# Patient Record
Sex: FEMALE | Race: WHITE | NOT HISPANIC OR LATINO
[De-identification: names, ages, dates, MRNs, and addresses within clinical notes are randomized per-mention and may not be internally consistent; named-entity substitution may affect disease eponyms.]

---

## 2017-05-19 ENCOUNTER — HOSPITAL (OUTPATIENT)
Dept: OTHER | Age: 22
End: 2017-05-19
Attending: NURSE PRACTITIONER

## 2017-05-19 LAB
CHLAMYDIA PROBE: NEGATIVE
CHLAMYDIA/GC SOURCE: NORMAL
CONTROL LINE: PRESENT
GC PROBE: NEGATIVE
HSV PCR SOURCE: NORMAL
HSV TYPE 1: NOT DETECTED
HSV TYPE 2: NOT DETECTED
Lab: CLEAR
N GON SOURCE: NORMAL
TRICH POC: NORMAL

## 2017-07-13 ENCOUNTER — HOSPITAL (OUTPATIENT)
Dept: OTHER | Age: 22
End: 2017-07-13
Attending: FAMILY MEDICINE

## 2018-02-25 ENCOUNTER — HOSPITAL (OUTPATIENT)
Dept: OTHER | Age: 23
End: 2018-02-25
Attending: FAMILY MEDICINE

## 2018-12-01 NOTE — LETTER
24    Paz Paz  :  1995    To Whom It May Concern:    This patient was seen in our office on 24.  Work status: She will continue to work no more than 6 hour shifts without lifting restrictions. I am recommending Xray and MRI of the lumbar spine, and then she is to follow up after the imaging is done.   If this office may be of further assistance, please do not hesitate to contact us.      Sincerely,        Galo Wilks DO    
[id number] [facility]

## 2019-01-23 ENCOUNTER — HOSPITAL (OUTPATIENT)
Dept: OTHER | Age: 24
End: 2019-01-23
Attending: PHYSICIAN ASSISTANT

## 2019-01-23 LAB
CONTROL LINE: PRESENT
CONTROL LINE: PRESENT
Lab: CLEAR
Lab: CLEAR
MONO POC: NEGATIVE
STREP POC: NEGATIVE

## 2020-01-20 ENCOUNTER — HOSPITAL (OUTPATIENT)
Dept: OTHER | Age: 25
End: 2020-01-20
Attending: NURSE PRACTITIONER

## 2020-01-20 LAB
CHLAMYDIA PROBE: NEGATIVE
CONTROL LINE: PRESENT
CONTROL LINE: PRESENT
GC PROBE: POSITIVE
Lab: CLEAR
Lab: CLEAR
N GON SOURCE: ABNORMAL
TRICH POC: NORMAL
UA APPEAR POC: CLEAR
UA BILI POC: NEGATIVE
UA BLOOD POC: ABNORMAL
UA COLOR POC: YELLOW
UA GLUCOSE POC: NEGATIVE
UA KETONES POC: NEGATIVE
UA LEUK EST POC: NEGATIVE
UA NITRITE POC: NEGATIVE
UA PH POC: 5.5 (ref 5–7)
UA PROTEIN POC: NEGATIVE
UA SPEC GRAV POC: 1.03 (ref 1.01–1.02)
UA UROBILIN POC: 0.2 MG/DL
URINE PREG POC: NEGATIVE

## 2020-02-08 ENCOUNTER — TELEPHONE (OUTPATIENT)
Dept: SCHEDULING | Age: 25
End: 2020-02-08

## 2021-08-21 ENCOUNTER — TELEPHONE (OUTPATIENT)
Dept: INTERNAL MEDICINE CLINIC | Facility: HOSPITAL | Age: 26
End: 2021-08-21

## 2021-08-21 DIAGNOSIS — Z20.822 CLOSE EXPOSURE TO COVID-19 VIRUS: Primary | ICD-10-CM

## 2021-08-21 NOTE — TELEPHONE ENCOUNTER
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 (e.g., during meal breaks):  Yes [x]   No []    If yes, who:   Do you share a workspace? Yes []   No [x]       If yes, with whom?     Do you have any family members si next 14 days. Test w/ [facility] 3-5 days after exposure.                                                  COVID-19 testing ordered: [] Rapid    [x] [facility]    Date test is to be taken:    8/24/21    []  Outside testing       [x] Manager notified

## 2021-08-24 ENCOUNTER — LAB ENCOUNTER (OUTPATIENT)
Dept: LAB | Facility: HOSPITAL | Age: 26
End: 2021-08-24
Attending: PREVENTIVE MEDICINE

## 2021-08-24 DIAGNOSIS — Z20.822 CLOSE EXPOSURE TO COVID-19 VIRUS: ICD-10-CM

## 2021-08-26 LAB — SARS-COV-2 RNA RESP QL NAA+PROBE: NOT DETECTED

## 2021-08-26 NOTE — TELEPHONE ENCOUNTER
Results and RTW guidelines:    COVID RESULT:    [] Viewed by employee in [address] RTW plan and instructions as indicated on triage call. Manager notified. Estimated RTW date:   [x] Discussed with employee   [] Unable to reach by phone.   Sent via [employer] new/worsening sx.   Discuss RTW guidelines with manager  [] May continue to work  [x] If test result is negative, return to work after 7 days from exposure date  [] Follow up with PCP  [] Home until further instruction from hotline with [facility] results  INS

## 2022-01-06 ENCOUNTER — TELEPHONE (OUTPATIENT)
Dept: INTERNAL MEDICINE CLINIC | Facility: HOSPITAL | Age: 27
End: 2022-01-06

## 2022-01-07 NOTE — TELEPHONE ENCOUNTER
Outside Covid Testing done    Results and RTW guidelines:    COVID RESULT reported:      Test type:    [x] Rapid outside         [] PCR outside    Date of test: 1/5/22     Test location: [address] [place] [address]         [] Result viewed in Epic with verbal consent received fro has a fever, vomiting or diarrhea   - Keep communication open with management about RTW and if symptoms worsen     Notes:     RTW PLAN:    []  If COVID positive results, off work minimum of 5 days from positive test or onset of symptoms (day 0)    [] Date of symptom onset: 1/4/22    SYMPTOMS:  [] asymptomatic    • Fever > 100F               Yes []          • Cough                          Yes []      • Shortness of breath  Yes []      • Congestion                 Yes []      • Runny nose

## 2022-08-05 ENCOUNTER — TELEPHONE (OUTPATIENT)
Dept: INTERNAL MEDICINE CLINIC | Facility: HOSPITAL | Age: 27
End: 2022-08-05

## 2022-08-05 ENCOUNTER — LAB ENCOUNTER (OUTPATIENT)
Dept: LAB | Facility: HOSPITAL | Age: 27
End: 2022-08-05
Attending: PREVENTIVE MEDICINE

## 2022-08-05 DIAGNOSIS — Z20.822 SUSPECTED 2019 NOVEL CORONAVIRUS INFECTION: Primary | ICD-10-CM

## 2022-08-05 DIAGNOSIS — Z20.822 SUSPECTED 2019 NOVEL CORONAVIRUS INFECTION: ICD-10-CM

## 2022-08-05 LAB — SARS-COV-2 RNA RESP QL NAA+PROBE: DETECTED

## 2022-08-05 NOTE — TELEPHONE ENCOUNTER
Results and RTW guidelines:    COVID RESULT:    [x] Viewed by employee in [address] RTW plan and instructions as indicated on triage call. Manager notified. Estimated RTW date:   [x] Discussed with employee   [] Unable to reach by phone. Sent via [employer] message      Test type:    [x] Rapid         [] [facility]         [] Outside test:       [x] Positive  Is employee unvaccinated? Yes []   No [x]          If yes:  Enter Covid Positive Medical exemption on Exemption Spreadsheet    Did you have close contact with someone on your unit while not wearing a mask? (e.g., during meal breaks):  Yes []   No [x]     If yes, who:     - Employee should quarantine at home for at least 5 days (day 1 is day after sx onset) , follow the CDC guidelines for cleaning and                              quarantining; see CDC.gov   -This employee may RTW on day 6 if asymptomatic or mildly symptomatic (with improving symptoms). Call Employee Health on day 5 if unable to return on day 6 after                      symptom onset.    -This employee needs to call Employee Health on day 5 after symptom onset. The employee needs to be cleared by Employee Health. - Monitor symptoms and temperature                 - Notify PCP of result                 - Seek emergent care with worsening symptoms   - If employee is still experiencing severe symptoms on day 5 must make a RTW appt with [employer] [facility], Employee will not be cleared if:    1. Has consistent cough, shortness of breath or fatigue that restricts your physical activities    2. Is still feeling \"unwell\"    3. Within 15 days of hospitalization for COVID    4. Within 20 days of intubation for COVID    5.  Still has a fever, vomiting or diarrhea   - Keep communication open with management about RTW and if symptoms worsen                - If outside testing completed, bring a copy of result to RTW appointment           Notes:     RTW PLAN:    [x]  If COVID positive results, off work minimum of 5 days from positive test or onset of symptoms (day 0)        On day 5, if asymptomatic or mildly symptomatic (with improving symptoms) may return to work day 6          On day 5, if symptomatic, call Employee Health for RTW screening        []  COVID positive result - call Employee Health on day 5 after symptom onset. The employee needs to be cleared by Employee Health to RTW. [] RTW immediately, continue to monitor for sx  [] RTW when sx improve; must be fever free for 24 hours w/o medications, Diarrhea/Vomiting free for 24 hours w/o medications  [] [facility] ordered; continue to monitor sx and call for new/worsening sx.   Discuss RTW guidelines with manager  [] May continue to work  [x] Follow up with PCP  [] Home until further instruction from hotline with [facility] results  INSTRUCTIONS PROVIDED:  [x]  Plan as noted above  []  Length of time to obtain results   [x]  Quarantine instructions  [x]  Masking protocol   []  S/S of worsening infection/condition and importance of prompt medical re-evaluation including when to seek emergency care  [] If symptoms develop, stay home and call hotline for rapid test order    Estimated RTW date:  8/9    [x] The employee voiced understanding of above plan/instructions  [x] Manager Notified

## 2022-11-17 ENCOUNTER — IMMUNIZATION (OUTPATIENT)
Dept: LAB | Facility: HOSPITAL | Age: 27
End: 2022-11-17
Attending: PREVENTIVE MEDICINE

## 2022-11-17 DIAGNOSIS — Z23 NEED FOR VACCINATION: Primary | ICD-10-CM

## 2022-11-17 PROCEDURE — 90471 IMMUNIZATION ADMIN: CPT

## 2023-04-20 ENCOUNTER — HOSPITAL ENCOUNTER (OUTPATIENT)
Age: 28
Discharge: HOME | End: 2023-04-20
Payer: COMMERCIAL

## 2023-04-20 VITALS
WEIGHT: 170 LBS | TEMPERATURE: 100 F | RESPIRATION RATE: 18 BRPM | HEIGHT: 66 IN | SYSTOLIC BLOOD PRESSURE: 136 MMHG | OXYGEN SATURATION: 97 % | BODY MASS INDEX: 27.32 KG/M2 | DIASTOLIC BLOOD PRESSURE: 77 MMHG | HEART RATE: 102 BPM

## 2023-04-20 DIAGNOSIS — J02.9 ACUTE VIRAL PHARYNGITIS: Primary | ICD-10-CM

## 2023-04-20 DIAGNOSIS — L73.9 FOLLICULITIS: ICD-10-CM

## 2023-04-20 DIAGNOSIS — R11.0 NAUSEA: ICD-10-CM

## 2023-04-20 LAB — S PYO AG THROAT QL: NEGATIVE

## 2023-04-20 PROCEDURE — 99213 OFFICE O/P EST LOW 20 MIN: CPT | Performed by: PHYSICIAN ASSISTANT

## 2023-04-20 PROCEDURE — 87880 STREP A ASSAY W/OPTIC: CPT | Performed by: PHYSICIAN ASSISTANT

## 2023-04-20 RX ORDER — CEPHALEXIN 500 MG/1
500 CAPSULE ORAL 3 TIMES DAILY
Qty: 21 CAPSULE | Refills: 0 | Status: SHIPPED | OUTPATIENT
Start: 2023-04-20 | End: 2023-04-27

## 2023-04-20 RX ORDER — IBUPROFEN 600 MG/1
TABLET ORAL
Qty: 20 TABLET | Refills: 0 | Status: SHIPPED | OUTPATIENT
Start: 2023-04-20

## 2023-04-20 RX ORDER — ONDANSETRON 4 MG/1
4 TABLET, ORALLY DISINTEGRATING ORAL EVERY 8 HOURS PRN
Qty: 10 TABLET | Refills: 0 | Status: SHIPPED | OUTPATIENT
Start: 2023-04-20 | End: 2023-04-23

## 2023-04-20 RX ORDER — NORETHINDRONE ACETATE AND ETHINYL ESTRADIOL 1MG-20(21)
KIT ORAL
COMMUNITY
Start: 2023-01-25

## 2023-04-20 RX ORDER — SPIRONOLACTONE 50 MG/1
50 TABLET, FILM COATED ORAL DAILY
COMMUNITY

## 2023-04-20 NOTE — ED INITIAL ASSESSMENT (HPI)
Sore throat and nausea began yesterday. Was around friends with strep throat and hand/foot/mouth this past weekend. Tylenol 1g taken about 0330.

## 2023-04-30 ENCOUNTER — TELEPHONE (OUTPATIENT)
Dept: OBGYN CLINIC | Facility: CLINIC | Age: 28
End: 2023-04-30

## 2023-04-30 NOTE — TELEPHONE ENCOUNTER
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 She has a Gyne up near [facility] who recommended pelvic floor PT evaluation and treatment. Unfortunately, she needs internal referral for PT here due to insurance. What do we have to do? I assume she has to see one of us for exam and referral. She is co-worker. Can we use [facility] if she needs to see one of us please? Thanks.

## 2023-05-01 NOTE — TELEPHONE ENCOUNTER
We cannot place any orders for pts that have not been sen by us. She will have to schedule an appointment to be seen by one of our providers. She can also have her gyne place an order for her PT to be done here or she can call and schedule appt since PPO. Spoke to pt. [facility] was told the only way insurance will cover is by an order being placed. [facility] will call her Gyne and see if they are able to place an outside order for her to get her PT done through [address] [place] [address] if they cannot she will call office and schedule appt.

## 2024-02-05 RX ORDER — SCOLOPAMINE TRANSDERMAL SYSTEM 1 MG/1
1 PATCH, EXTENDED RELEASE TRANSDERMAL
Qty: 10 PATCH | Refills: 1 | Status: SHIPPED | OUTPATIENT
Start: 2024-02-05

## 2024-02-05 RX ORDER — ONDANSETRON 4 MG/1
4 TABLET, ORALLY DISINTEGRATING ORAL EVERY 8 HOURS PRN
Qty: 30 TABLET | Refills: 0 | Status: SHIPPED | OUTPATIENT
Start: 2024-02-05

## 2024-05-06 ENCOUNTER — HOSPITAL ENCOUNTER (OUTPATIENT)
Age: 29
Discharge: HOME | End: 2024-05-06
Payer: COMMERCIAL

## 2024-05-06 VITALS
SYSTOLIC BLOOD PRESSURE: 116 MMHG | OXYGEN SATURATION: 98 % | DIASTOLIC BLOOD PRESSURE: 70 MMHG | HEART RATE: 101 BPM | TEMPERATURE: 100 F | RESPIRATION RATE: 18 BRPM

## 2024-05-06 DIAGNOSIS — J02.9 PHARYNGITIS, UNSPECIFIED ETIOLOGY: Primary | ICD-10-CM

## 2024-05-06 LAB
B-HCG UR QL: NEGATIVE
CLARITY UR: CLEAR
COLOR UR: YELLOW
GLUCOSE UR STRIP-MCNC: NEGATIVE MG/DL
KETONES UR STRIP-MCNC: 80 MG/DL
LEUKOCYTE ESTERASE UR QL STRIP: NEGATIVE
NITRITE UR QL STRIP: NEGATIVE
PH UR STRIP: 6 [PH]
POCT INFLUENZA A: NEGATIVE
POCT INFLUENZA B: NEGATIVE
POCT MONO: NEGATIVE
PROT UR STRIP-MCNC: 30 MG/DL
S PYO AG THROAT QL: NEGATIVE
SARS-COV-2 RNA RESP QL NAA+PROBE: NOT DETECTED
SP GR UR STRIP: >=1.03
UROBILINOGEN UR STRIP-ACNC: <2 MG/DL

## 2024-05-06 PROCEDURE — 81002 URINALYSIS NONAUTO W/O SCOPE: CPT | Performed by: NURSE PRACTITIONER

## 2024-05-06 PROCEDURE — U0002 COVID-19 LAB TEST NON-CDC: HCPCS | Performed by: NURSE PRACTITIONER

## 2024-05-06 PROCEDURE — 87880 STREP A ASSAY W/OPTIC: CPT | Performed by: NURSE PRACTITIONER

## 2024-05-06 PROCEDURE — 96372 THER/PROPH/DIAG INJ SC/IM: CPT | Performed by: NURSE PRACTITIONER

## 2024-05-06 PROCEDURE — 99214 OFFICE O/P EST MOD 30 MIN: CPT | Performed by: NURSE PRACTITIONER

## 2024-05-06 PROCEDURE — 86308 HETEROPHILE ANTIBODY SCREEN: CPT | Performed by: NURSE PRACTITIONER

## 2024-05-06 PROCEDURE — 87502 INFLUENZA DNA AMP PROBE: CPT | Performed by: NURSE PRACTITIONER

## 2024-05-06 PROCEDURE — 81025 URINE PREGNANCY TEST: CPT | Performed by: NURSE PRACTITIONER

## 2024-05-06 RX ORDER — AMOXICILLIN 500 MG/1
500 TABLET, FILM COATED ORAL 2 TIMES DAILY
Qty: 20 TABLET | Refills: 0 | Status: SHIPPED | OUTPATIENT
Start: 2024-05-06 | End: 2024-05-16

## 2024-05-06 RX ORDER — AZITHROMYCIN 250 MG/1
1000 TABLET, FILM COATED ORAL ONCE
Status: COMPLETED | OUTPATIENT
Start: 2024-05-06 | End: 2024-05-06

## 2024-05-06 RX ORDER — KETOROLAC TROMETHAMINE 30 MG/ML
30 INJECTION, SOLUTION INTRAMUSCULAR; INTRAVENOUS ONCE
Status: COMPLETED | OUTPATIENT
Start: 2024-05-06 | End: 2024-05-06

## 2024-05-06 RX ORDER — DEXAMETHASONE SODIUM PHOSPHATE 10 MG/ML
10 INJECTION, SOLUTION INTRAMUSCULAR; INTRAVENOUS ONCE
Status: COMPLETED | OUTPATIENT
Start: 2024-05-06 | End: 2024-05-06

## 2024-05-06 NOTE — ED PROVIDER NOTES
Patient Seen in: [facility]      History     Chief Complaint   Patient presents with    Fever     Stated Complaint: Cold  Subjective:   29-year-old female presents for a sore throat, body aches, fatigue, and fevers that started about 2 days ago.  No difficulty swallowing, but she states she has not eating or drinking because it hurts so bad to swallow.  Speech is clear.  No cough or congestion.  No neck stiffness.  No rashes.  No headaches or dizziness.  No known exposure to strep throat, but she did start having intercourse with an old partner and is unaware if he may have exposed her to an STD in her throat.  No rashes.  No vaginal complaints.  She appears nontoxic.      Objective:   History reviewed. No pertinent past medical history.         History reviewed. No pertinent surgical history.           No pertinent social history.          Review of Systems    Positive for stated complaint: Cold  Other systems are as noted in HPI.  Constitutional and vital signs reviewed.      All other systems reviewed and negative except as noted above.    Physical Exam     ED Triage Vitals [05/06/24 0907]   /70   Pulse 120   Resp 18   Temp 100.4 °F (38 °C)   Temp src Oral   SpO2 97 %   O2 Device None (Room air)     Current:/70   Pulse 101   Temp 100.4 °F (38 °C) (Oral)   Resp 18   SpO2 98%     Physical Exam  Vitals and nursing note reviewed.   Constitutional:       General: She is not in acute distress.     Appearance: Normal appearance. She is not toxic-appearing.   HENT:      Head: Normocephalic.      Right Ear: Tympanic membrane normal.      Left Ear: Tympanic membrane normal.      Nose: Nose normal. No congestion or rhinorrhea.      Mouth/Throat:      Mouth: Mucous membranes are moist.      Pharynx: Uvula midline. Oropharyngeal exudate and posterior oropharyngeal erythema present. No pharyngeal swelling or uvula swelling.      Tonsils: Tonsillar exudate present. No tonsillar abscesses.   Eyes:       Conjunctiva/sclera: Conjunctivae normal.      Pupils: Pupils are equal, round, and reactive to light.   Cardiovascular:      Rate and Rhythm: Regular rhythm. Tachycardia present.   Pulmonary:      Effort: Pulmonary effort is normal.      Breath sounds: Normal breath sounds. No stridor. No wheezing, rhonchi or rales.   Abdominal:      Palpations: Abdomen is soft.      Tenderness: There is no abdominal tenderness.   Musculoskeletal:         General: Normal range of motion.      Cervical back: Normal range of motion and neck supple.   Lymphadenopathy:      Cervical: Cervical adenopathy present.   Skin:     General: Skin is warm and dry.      Capillary Refill: Capillary refill takes less than 2 seconds.      Findings: No rash.   Neurological:      General: No focal deficit present.      Mental Status: She is alert and oriented to person, place, and time.   Psychiatric:         Mood and Affect: Mood normal.         Behavior: Behavior normal.         ED Course   No results found.  Labs Reviewed   [facility] POCT URINALYSIS DIPSTICK - Abnormal; Notable for the following components:       Result Value    Protein urine 30 (*)     Ketone, Urine 80 (*)     Bilirubin, Urine Small (*)     Blood, Urine Moderate (*)     All other components within normal limits   POCT MONO TEST - Normal   POCT RAPID STREP - Normal   POCT PREGNANCY URINE - Normal   POCT FLU TEST - Normal    Narrative:     This assay is a rapid molecular in vitro test utilizing nucleic acid amplification of influenza A and B viral RNA.   RAPID SARS-COV-2 BY PCR - Normal   CHLAMYDIA/GONOCOCCUS, PHARY       MDM     Medical Decision Making  The patient is aware of all of the testing results below.  I gave her IV fluids with Toradol and Decadron.  She is feeling better.  I will treat her for gonorrhea and chlamydia due to her suspicion that she was exposed with ceftriaxone and Zithromax.  I will also treat her for bacterial throat infection with amoxicillin.  We discussed  pushing fluids, rest, and Tylenol or Motrin as needed for pain or fever.  She will follow-up with her primary care doctor.  She is aware for gonorrhea and chlamydia test come back positive, her partner will need treatment.    Amount and/or Complexity of Data Reviewed  Labs: ordered.     Details: Strep, COVID, flu, and mono tests are all negative.  The urine dip shows ketones and 30 of protein with moderate blood.  She is due for her menses.  UCG negative.  A gonorrhea and chlamydia of the throat is pending.  Discussion of management or test interpretation with external provider(s): I discussed this patient with Dr. Costa.  She agrees with plan of care.    Risk  OTC drugs.  Prescription drug management.  Risk Details: Differential diagnoses are gonorrhea of the throat, chlamydia of the throat, strep throat, viral syndrome        Disposition and Plan     Clinical Impression:  1. Pharyngitis, unspecified etiology         Disposition:  Discharge  5/6/2024 11:41 am    Follow-up:  PMD    Schedule an appointment as soon as possible for a visit in 3 days            Medications Prescribed:  Current Discharge Medication List        START taking these medications    Details   amoxicillin 500 MG Oral Tab Take 1 tablet (500 mg total) by mouth 2 (two) times daily for 10 days.  Qty: 20 tablet, Refills: 0

## 2024-05-06 NOTE — DISCHARGE INSTRUCTIONS
Push fluids.  Rest.  Salt water gargles.  Tylenol or Motrin as needed for pain or fever.  Take the amoxicillin as prescribed.  Your gonorrhea and Chlamydia testing is pending.  If this test comes back positive, your partner will need to be treated.  Follow-up with your doctor.  For any worsening or concerning symptoms, please go to the nearest emergency department for further evaluation.

## 2024-05-06 NOTE — ED INITIAL ASSESSMENT (HPI)
Pt with fever (tmax 102.7). headache, sore throat and body aches since yesterday. Pt also concerned for STD after getting back together with old boyfriend.

## 2024-05-08 LAB
C. TRACHOMATIS, NAA, PHARYN: NEGATIVE
N. GONORRHOEAE, NAA, PHARYN: NEGATIVE

## 2024-05-14 ENCOUNTER — LAB ENCOUNTER (OUTPATIENT)
Dept: LAB | Facility: HOSPITAL | Age: 29
End: 2024-05-14
Attending: ADVANCED PRACTICE MIDWIFE

## 2024-05-14 ENCOUNTER — OFFICE VISIT (OUTPATIENT)
Dept: OBGYN CLINIC | Facility: CLINIC | Age: 29
End: 2024-05-14

## 2024-05-14 VITALS
DIASTOLIC BLOOD PRESSURE: 85 MMHG | HEIGHT: 66 IN | SYSTOLIC BLOOD PRESSURE: 130 MMHG | WEIGHT: 162 LBS | HEART RATE: 101 BPM | BODY MASS INDEX: 26.03 KG/M2

## 2024-05-14 DIAGNOSIS — Z11.3 ROUTINE SCREENING FOR STI (SEXUALLY TRANSMITTED INFECTION): ICD-10-CM

## 2024-05-14 DIAGNOSIS — Z01.419 WELL WOMAN EXAM WITH ROUTINE GYNECOLOGICAL EXAM: Primary | ICD-10-CM

## 2024-05-14 PROBLEM — K59.00 CONSTIPATION: Status: ACTIVE | Noted: 2024-05-14

## 2024-05-14 PROBLEM — B00.9 HERPES SIMPLEX TYPE 1 ANTIBODY POSITIVE: Status: ACTIVE | Noted: 2024-05-14

## 2024-05-14 LAB
HBV SURFACE AG SER-ACNC: <0.1 [IU]/L
HBV SURFACE AG SERPL QL IA: NONREACTIVE
HCV AB SERPL QL IA: NONREACTIVE
T PALLIDUM AB SER QL IA: NONREACTIVE

## 2024-05-14 PROCEDURE — 86803 HEPATITIS C AB TEST: CPT

## 2024-05-14 PROCEDURE — 87340 HEPATITIS B SURFACE AG IA: CPT

## 2024-05-14 PROCEDURE — 87389 HIV-1 AG W/HIV-1&-2 AB AG IA: CPT

## 2024-05-14 PROCEDURE — 36415 COLL VENOUS BLD VENIPUNCTURE: CPT

## 2024-05-14 PROCEDURE — 99385 PREV VISIT NEW AGE 18-39: CPT | Performed by: ADVANCED PRACTICE MIDWIFE

## 2024-05-14 PROCEDURE — 86780 TREPONEMA PALLIDUM: CPT

## 2024-05-14 RX ORDER — NORETHINDRONE ACETATE AND ETHINYL ESTRADIOL 1MG-20(21)
1 KIT ORAL DAILY
Qty: 84 TABLET | Refills: 3 | Status: SHIPPED | OUTPATIENT
Start: 2024-05-14

## 2024-05-14 NOTE — PROGRESS NOTES
Chief Complaint   Patient presents with    Gyn Exam     Annual exam. Per patient requesting STD labs done          HPI:   Paz is 29 year old , here today Annual and STD testing. Had broken up with partner of 10 mos and he was with someone else. They got back together and she is concerned about STD exposure.  She had stopped taking her OCPs when they broke up but now wants to restart.  Needs a new RX.  She did have unprotected intercourse within the past 5 days.  Declines PlanB.  LMP was 24    She was seen in IC for sore throat and was treated with Rocephin inj and Azithromicin (STD prophylaxis) and then Amoxicillin for strep thoat even though negative culture.   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    Menstrual cycles: monthly/ 4-5 days/ light   Sexually active: yes  Contraception:   STD screening: yes  Depression: denies    Diet/Exercise: Low carb. yes/   Routine dental care: yes  Seatbelt use: yes    Note: This is a gyn only visit. Pt does not have PCP at [facility], needs to find new PCP/is referred back to PCP for care of non-gyn any concerns listed here.    HISTORY:  History reviewed. No pertinent past medical history.   Hx Prior Abnormal Pap: Yes    History reviewed. No pertinent surgical history.   History reviewed. No pertinent family history.   Social History:   Social History     Socioeconomic History    Marital status: Single   Tobacco Use    Smoking status: Never    Smokeless tobacco: Never   Substance and Sexual Activity    Alcohol use: Yes     Comment: social    Drug use: Not Currently     Social Determinants of Health     Financial Resource Strain: Not on File (10/6/2022)    Received from [facility]     Financial Resource Strain     Financial Resource Strain: 0   Food Insecurity: Low Risk  (10/9/2023)    Received from [facility]    Food Insecurity     Have there been times that your food ran out, and you didn't have money to get  more?: No     Are there times that you worry that this might happen?: No   Transportation Needs: Low Risk  (10/9/2023)    [facility]    Transportation Needs     Do you have trouble getting transportation to medical appointments?: No     How do you normally get to and from your appointments?: Other   Physical Activity: Not on File (10/6/2022)    Received from [facility]     Physical Activity     Physical Activity: 0   Stress: Not on File (10/6/2022)    Received from [facility]     Stress     Stress: 0   Social Connections: Not on File (10/6/2022)    Received from [facility]     Social Connections     Social Connections and Isolation: 0       Safe relationship/safe home environment yes     Depression Screening (PHQ-2/PHQ-9): Over the LAST 2 WEEKS   Little interest or pleasure in doing things (over the last two weeks)?: Not at all  Little interest or pleasure in doing things: Not at all    Feeling down, depressed, or hopeless (over the last two weeks)?: Not at all  Feeling down, depressed, or hopeless: Not at all    PHQ-2 SCORE: 0  PHQ-2 SCORE: 0        Immunization History   Administered Date(s) Administered    Covid-19 Vaccine Pfizer 30 mcg/0.3 ml 10/13/2021, 11/05/2021    FLULAVAL 6 months & older 0.5 ml Prefilled syringe (43258) 11/17/2022    Influenza 10/20/2021        Medications (Active prior to today's visit):  Current Outpatient Medications   Medication Sig Dispense Refill    BLISOVI FE 1/20 1-20 MG-MCG Oral Tab Take 1 tablet by mouth daily. 84 tablet 3    amoxicillin 500 MG Oral Tab Take 1 tablet (500 mg total) by mouth 2 (two) times daily for 10 days. 20 tablet 0    spironolactone 50 MG Oral Tab Take 1 tablet (50 mg total) by mouth daily.      ondansetron 4 MG Oral Tablet Dispersible Take 1 tablet (4 mg total) by mouth every 8 (eight) hours as needed for Nausea. (Patient not taking: Reported on 5/14/2024) 30 tablet 0    Scopolamine 1.5mg TD patch 1mg/3days Place 1  patch onto the skin every third day. 10 patch 1    ibuprofen 600 MG Oral Tab Take 1 tablet (600 mg total) by mouth every 6 hours with food (Patient not taking: Reported on 5/14/2024) 20 tablet 0       Allergies:  Allergies   Allergen Reactions    Latex HIVES, ITCHING, RASH and SWELLING       ROS:  Review of Systems   Constitutional: Negative.    HENT: Negative.     Eyes: Negative.    Respiratory: Negative.     Cardiovascular: Negative.    Gastrointestinal: Negative.    Genitourinary: Negative.         Increased vaginal d/c   Musculoskeletal: Negative.    Skin: Negative.    Neurological: Negative.    Psychiatric/Behavioral: Negative.         PHYSICAL EXAM:  Vitals:    05/14/24 1208   BP: 130/85   Pulse: 101     Physical Exam  Vitals reviewed.   Constitutional:       Appearance: Normal appearance.   HENT:      Head: Normocephalic.   Cardiovascular:      Rate and Rhythm: Normal rate and regular rhythm.   Pulmonary:      Effort: Pulmonary effort is normal.      Breath sounds: Normal breath sounds.   Chest:   Breasts:     Right: Normal. No mass or tenderness.      Left: Normal. No mass or tenderness.   Genitourinary:     Pubic Area: No rash.       Labia:         Right: No lesion.         Left: No lesion.       Vagina: Normal. No vaginal discharge or bleeding.      Cervix: No cervical motion tenderness.      Uterus: Normal. Not enlarged and not tender.       Adnexa:         Right: No mass or tenderness.          Left: No mass or tenderness.     Lymphadenopathy:      Upper Body:      Right upper body: No axillary adenopathy.      Left upper body: No axillary adenopathy.      Lower Body: No right inguinal adenopathy. No left inguinal adenopathy.   Skin:     General: Skin is warm and dry.   Neurological:      Mental Status: She is alert and oriented to person, place, and time.   Psychiatric:         Mood and Affect: Mood normal.         Behavior: Behavior normal.          Last pap was 9/8/2021 NILM    No results found for  this or any previous visit (from the past 24 hour(s)).      ASSESSMENT/PLAN:     Paz was seen today for gyn exam.    Diagnoses and all orders for this visit:    Well woman exam with routine gynecological exam  -     Vaginitis Vaginosis PCR Panel; Future  -     Vaginitis Vaginosis PCR Panel    Routine screening for STI (sexually transmitted infection)  -     HCV Antibody; Future  -     Hepatitis B Surface Antigen; Future  -     HIV Ag/Ab Combo; Future  -     T Pallidum Screening Cascade; Future  -     Vaginitis Vaginosis PCR Panel; Future  -     Chlamydia/Gc Amplification; Future  -     Chlamydia/Gc Amplification  -     Vaginitis Vaginosis PCR Panel    Other orders  -     BLISOVI FE 1/20 1-20 MG-MCG Oral Tab; Take 1 tablet by mouth daily.        Next annual due: 1 year  Follow-up/Return to clinic: PRN    Counseling:   Best hygiene practices  Recommendations and rationale for COVID, Tdap, HPV, and flu shots  Contraceptive method(s), STI and HIV risk reduction/condom use  Emergency contraception reviewed  Intimate Partner Violence/recognition of sexual coercion and methods to resist and abstain as a choice  Frequency of health screening and personal risks  Pap, SBE/CBE, mammography  Smoking cessation/avoidance encouraged  Preconception counseling, including use of folic acid  Benefits of daily vitamin D, folic acid, and adequate fresh fruits and vegetables  Benefits of daily exercise      20 minutes face to face counseling, chart review, orders and coordination of care    Patient verbalized understanding, All questions answered. No barriers to learning identified    Priscilla Dooley CNM  Specimen

## 2024-05-15 LAB
BV BACTERIA DNA VAG QL NAA+PROBE: NEGATIVE
C GLABRATA DNA VAG QL NAA+PROBE: NEGATIVE
C KRUSEI DNA VAG QL NAA+PROBE: NEGATIVE
C TRACH DNA SPEC QL NAA+PROBE: NEGATIVE
CANDIDA DNA VAG QL NAA+PROBE: NEGATIVE
N GONORRHOEA DNA SPEC QL NAA+PROBE: NEGATIVE
T VAGINALIS DNA VAG QL NAA+PROBE: NEGATIVE

## 2024-05-21 ENCOUNTER — TELEPHONE (OUTPATIENT)
Dept: OBGYN CLINIC | Facility: CLINIC | Age: 29
End: 2024-05-21

## 2024-05-21 RX ORDER — METRONIDAZOLE 7.5 MG/G
1 GEL VAGINAL NIGHTLY
Qty: 70 G | Refills: 0 | Status: SHIPPED | OUTPATIENT
Start: 2024-05-21 | End: 2024-05-26

## 2024-05-21 NOTE — TELEPHONE ENCOUNTER
Called and spoke with Paz.  She is having symptoms of irritation/itching.  Denies clumpy discharge.  Would like to try Metrogel.   Rx sent to the [facility] pharmacy for Metrogel 0.75% intravag q HS x 5.  All questions were answered.  Pt verbalized understanding.    ----- Message from Mariaelena IRVING sent at 5/17/2024 11:06 AM CDT -----  Regarding: FW: Test results  Contact: [phone]    ----- Message -----  From: Paz Paz  Sent: 5/17/2024  10:10 AM CDT  To: [facility] Clinical Pool  Subject: Test results                                     I seen all the labs came back negative. I’m having greenish discharge though. I took a picture but it wouldn’t let me attach it here. Could it have been a false negative BV? I’m definitely still a little itchy. Thank you!

## 2024-09-04 ENCOUNTER — TELEPHONE (OUTPATIENT)
Dept: FAMILY MEDICINE CLINIC | Facility: CLINIC | Age: 29
End: 2024-09-04

## 2024-09-04 NOTE — TELEPHONE ENCOUNTER
Reason for Call/Symptoms: Seeking appointment to establish.  Former patient of Dr Coronado/[facility].  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  Wants to be tested for cortisol level and hypothyroidism.  Has had hair loss, weight gain of 14 pounds.  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  Onset: ongoing   Courtesy Assessment: Patient had normal TSH 10/10/2023.  Level of care recommendation:  Needs appointment to establish.  She agreed to see JOSIE to start workup, then plans to have Dr Ash as PCP.  Advice given to patient: Schedule appointment.    Future Appointments   Date Time Provider Department Center   9/16/2024  2:00 PM Kelsi Ledesma APRN [facility]   11/1/2024  1:30 PM Nancy Ash MD [facility]

## 2024-09-16 ENCOUNTER — LAB ENCOUNTER (OUTPATIENT)
Dept: LAB | Age: 29
End: 2024-09-16
Payer: COMMERCIAL

## 2024-09-16 ENCOUNTER — OFFICE VISIT (OUTPATIENT)
Dept: FAMILY MEDICINE CLINIC | Facility: CLINIC | Age: 29
End: 2024-09-16
Payer: COMMERCIAL

## 2024-09-16 VITALS
OXYGEN SATURATION: 96 % | HEART RATE: 80 BPM | SYSTOLIC BLOOD PRESSURE: 120 MMHG | WEIGHT: 170 LBS | BODY MASS INDEX: 27.32 KG/M2 | DIASTOLIC BLOOD PRESSURE: 85 MMHG | HEIGHT: 66 IN

## 2024-09-16 DIAGNOSIS — Z00.00 ROUTINE PHYSICAL EXAMINATION: Primary | ICD-10-CM

## 2024-09-16 DIAGNOSIS — L65.9 HAIR LOSS: ICD-10-CM

## 2024-09-16 DIAGNOSIS — G47.00 INSOMNIA, UNSPECIFIED TYPE: ICD-10-CM

## 2024-09-16 DIAGNOSIS — R63.5 WEIGHT GAIN: ICD-10-CM

## 2024-09-16 DIAGNOSIS — L91.8 SKIN TAG: ICD-10-CM

## 2024-09-16 DIAGNOSIS — R35.0 URINARY FREQUENCY: ICD-10-CM

## 2024-09-16 DIAGNOSIS — Z00.00 ROUTINE PHYSICAL EXAMINATION: ICD-10-CM

## 2024-09-16 DIAGNOSIS — L98.9 SKIN LESION: ICD-10-CM

## 2024-09-16 DIAGNOSIS — H69.91 EUSTACHIAN TUBE DYSFUNCTION, RIGHT: ICD-10-CM

## 2024-09-16 LAB
ALBUMIN SERPL-MCNC: 4.7 G/DL (ref 3.2–4.8)
ALBUMIN/GLOB SERPL: 1.4 {RATIO} (ref 1–2)
ALP LIVER SERPL-CCNC: 75 U/L
ALT SERPL-CCNC: 10 U/L
ANION GAP SERPL CALC-SCNC: 7 MMOL/L (ref 0–18)
AST SERPL-CCNC: 17 U/L (ref ?–34)
BASOPHILS # BLD AUTO: 0.01 X10(3) UL (ref 0–0.2)
BASOPHILS NFR BLD AUTO: 0.1 %
BILIRUB SERPL-MCNC: 0.6 MG/DL (ref 0.3–1.2)
BILIRUB UR QL: NEGATIVE
BUN BLD-MCNC: 14 MG/DL (ref 9–23)
BUN/CREAT SERPL: 15.7 (ref 10–20)
CALCIUM BLD-MCNC: 9.8 MG/DL (ref 8.7–10.4)
CHLORIDE SERPL-SCNC: 105 MMOL/L (ref 98–112)
CHOLEST SERPL-MCNC: 219 MG/DL (ref ?–200)
CLARITY UR: CLEAR
CO2 SERPL-SCNC: 26 MMOL/L (ref 21–32)
COLOR UR: YELLOW
CORTIS SERPL-MCNC: 17.7 UG/DL
CREAT BLD-MCNC: 0.89 MG/DL
DEPRECATED HBV CORE AB SER IA-ACNC: 222.6 NG/ML
DEPRECATED RDW RBC AUTO: 38.5 FL (ref 35.1–46.3)
EGFRCR SERPLBLD CKD-EPI 2021: 90 ML/MIN/1.73M2 (ref 60–?)
EOSINOPHIL # BLD AUTO: 0.03 X10(3) UL (ref 0–0.7)
EOSINOPHIL NFR BLD AUTO: 0.4 %
ERYTHROCYTE [DISTWIDTH] IN BLOOD BY AUTOMATED COUNT: 11.8 % (ref 11–15)
EST. AVERAGE GLUCOSE BLD GHB EST-MCNC: 105 MG/DL (ref 68–126)
FASTING PATIENT LIPID ANSWER: NO
FASTING STATUS PATIENT QL REPORTED: NO
GLOBULIN PLAS-MCNC: 3.4 G/DL (ref 2–3.5)
GLUCOSE BLD-MCNC: 79 MG/DL (ref 70–99)
GLUCOSE UR-MCNC: NORMAL MG/DL
HBA1C MFR BLD: 5.3 % (ref ?–5.7)
HCT VFR BLD AUTO: 44 %
HDLC SERPL-MCNC: 60 MG/DL (ref 40–59)
HGB BLD-MCNC: 14.7 G/DL
HGB UR QL STRIP.AUTO: NEGATIVE
IMM GRANULOCYTES # BLD AUTO: 0.02 X10(3) UL (ref 0–1)
IMM GRANULOCYTES NFR BLD: 0.3 %
IRON SATN MFR SERPL: 20 %
IRON SERPL-MCNC: 91 UG/DL
LDLC SERPL CALC-MCNC: 135 MG/DL (ref ?–100)
LEUKOCYTE ESTERASE UR QL STRIP.AUTO: NEGATIVE
LYMPHOCYTES # BLD AUTO: 1.71 X10(3) UL (ref 1–4)
LYMPHOCYTES NFR BLD AUTO: 22.6 %
MCH RBC QN AUTO: 29.9 PG (ref 26–34)
MCHC RBC AUTO-ENTMCNC: 33.4 G/DL (ref 31–37)
MCV RBC AUTO: 89.6 FL
MONOCYTES # BLD AUTO: 0.62 X10(3) UL (ref 0.1–1)
MONOCYTES NFR BLD AUTO: 8.2 %
NEUTROPHILS # BLD AUTO: 5.16 X10 (3) UL (ref 1.5–7.7)
NEUTROPHILS # BLD AUTO: 5.16 X10(3) UL (ref 1.5–7.7)
NEUTROPHILS NFR BLD AUTO: 68.4 %
NITRITE UR QL STRIP.AUTO: NEGATIVE
NONHDLC SERPL-MCNC: 159 MG/DL (ref ?–130)
OSMOLALITY SERPL CALC.SUM OF ELEC: 285 MOSM/KG (ref 275–295)
PH UR: 6 [PH] (ref 5–8)
PLATELET # BLD AUTO: 384 10(3)UL (ref 150–450)
POTASSIUM SERPL-SCNC: 4.3 MMOL/L (ref 3.5–5.1)
PROT SERPL-MCNC: 8.1 G/DL (ref 5.7–8.2)
PROT UR-MCNC: 20 MG/DL
RBC # BLD AUTO: 4.91 X10(6)UL
SODIUM SERPL-SCNC: 138 MMOL/L (ref 136–145)
SP GR UR STRIP: >1.03 (ref 1–1.03)
TIBC SERPL-MCNC: 447 UG/DL (ref 250–425)
TRANSFERRIN SERPL-MCNC: 300 MG/DL (ref 250–380)
TRIGL SERPL-MCNC: 134 MG/DL (ref 30–149)
TSI SER-ACNC: 0.78 MIU/ML (ref 0.55–4.78)
UROBILINOGEN UR STRIP-ACNC: NORMAL
VIT D+METAB SERPL-MCNC: 33.2 NG/ML (ref 30–100)
VLDLC SERPL CALC-MCNC: 24 MG/DL (ref 0–30)
WBC # BLD AUTO: 7.6 X10(3) UL (ref 4–11)

## 2024-09-16 PROCEDURE — 82306 VITAMIN D 25 HYDROXY: CPT

## 2024-09-16 PROCEDURE — 82728 ASSAY OF FERRITIN: CPT

## 2024-09-16 PROCEDURE — 80053 COMPREHEN METABOLIC PANEL: CPT

## 2024-09-16 PROCEDURE — 36415 COLL VENOUS BLD VENIPUNCTURE: CPT

## 2024-09-16 PROCEDURE — 83540 ASSAY OF IRON: CPT

## 2024-09-16 PROCEDURE — 84466 ASSAY OF TRANSFERRIN: CPT

## 2024-09-16 PROCEDURE — 84443 ASSAY THYROID STIM HORMONE: CPT

## 2024-09-16 PROCEDURE — 80061 LIPID PANEL: CPT

## 2024-09-16 PROCEDURE — 82533 TOTAL CORTISOL: CPT

## 2024-09-16 PROCEDURE — 83036 HEMOGLOBIN GLYCOSYLATED A1C: CPT

## 2024-09-16 PROCEDURE — 85025 COMPLETE CBC W/AUTO DIFF WBC: CPT

## 2024-09-16 PROCEDURE — 81001 URINALYSIS AUTO W/SCOPE: CPT

## 2024-09-16 NOTE — PROGRESS NOTES
HPI:    Patient ID: Paz Paz is a 29 year old female.    HPI     Patient here in office for physical.  Pap completed last year with colposcopy, repeat Joel needed.  Patient scheduled for follow-up with OB/GYN in October 2024.  Reports regular menstrual cycles.     Complains of intermittent migraines, occurs several days before menstrual cycle.  Also reports light sensitivity and ocular pain in right eye.takes Excedrin Migraine over-the-counter and migraine compress with mild improvement.     Reports urinary frequency, occurs mostly at night.  Denies urinary urgency, hematuria, flank pain, or fever.  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  Also occasionally drinks matcha tea.     Concerned about hair loss, weight gain of 15 lbs in last 4 months, and insomnia. Also works as   for young children, unsure if stress related. Interested in cortisol/thyroid testing.     Complains of right ear congestion. Recently treated for otitis media.     Requesting referral for dermatology for skin lesion on chest/groin area.       Review of Systems   Constitutional: Negative.  Negative for fever.   HENT:  Positive for ear pain. Negative for sore throat.    Eyes: Negative.  Negative for visual disturbance.   Respiratory: Negative.  Negative for cough and shortness of breath.    Cardiovascular: Negative.  Negative for chest pain and palpitations.   Gastrointestinal: Negative.  Negative for abdominal pain, blood in stool, constipation and diarrhea.   Genitourinary:  Positive for frequency. Negative for dysuria, hematuria and menstrual problem.   Musculoskeletal: Negative.  Negative for arthralgias and myalgias.   Skin: Negative.  Negative for rash.   Neurological:  Positive for headaches. Negative for dizziness and facial asymmetry.   Psychiatric/Behavioral:  Positive for sleep disturbance.             Current Outpatient Medications   Medication Sig Dispense Refill     MAGNESIUM CITRATE OR Take by mouth at bedtime.      BLISOVI FE 1/20 1-20 MG-MCG Oral Tab Take 1 tablet by mouth daily. 84 tablet 3    spironolactone 50 MG Oral Tab Take 2 tablets (100 mg total) by mouth daily.       Allergies:  Allergies   Allergen Reactions    Latex HIVES, ITCHING, RASH and SWELLING      /85 (BP Location: Right arm, Patient Position: Sitting, Cuff Size: adult)   Pulse 80   Ht 5' 6\" (1.676 m)   Wt 170 lb (77.1 kg)   LMP 08/26/2024 (Approximate)   SpO2 96%   BMI 27.44 kg/m²   Body mass index is 27.44 kg/m².  PHYSICAL EXAM:   Physical Exam  Vitals reviewed.   Constitutional:       General: She is not in acute distress.     Appearance: Normal appearance. She is well-developed. She is not ill-appearing.   HENT:      Head: Normocephalic and atraumatic.      Right Ear: Tympanic membrane, ear canal and external ear normal. There is no impacted cerumen.      Left Ear: Tympanic membrane, ear canal and external ear normal. There is no impacted cerumen.      Ears:      Comments: Right ear effusion, no erythema      Nose: Nose normal. No congestion.      Mouth/Throat:      Mouth: Mucous membranes are moist.      Pharynx: Oropharynx is clear. No oropharyngeal exudate or posterior oropharyngeal erythema.   Eyes:      General:         Right eye: No discharge.         Left eye: No discharge.      Extraocular Movements: Extraocular movements intact.      Conjunctiva/sclera: Conjunctivae normal.      Pupils: Pupils are equal, round, and reactive to light.   Cardiovascular:      Rate and Rhythm: Normal rate and regular rhythm.      Heart sounds: Normal heart sounds. No murmur heard.     No friction rub. No gallop.   Pulmonary:      Effort: Pulmonary effort is normal. No respiratory distress.      Breath sounds: Normal breath sounds. No stridor. No wheezing, rhonchi or rales.   Chest:      Chest wall: No tenderness.   Abdominal:      General: Bowel sounds are normal. There is no distension.      Palpations:  Abdomen is soft. There is no mass.      Tenderness: There is no abdominal tenderness. There is no right CVA tenderness, left CVA tenderness, guarding or rebound.      Hernia: No hernia is present.   Genitourinary:     General: Normal vulva.      Vagina: Normal.   Musculoskeletal:         General: No tenderness. Normal range of motion.      Cervical back: Normal range of motion and neck supple.   Lymphadenopathy:      Cervical: No cervical adenopathy.   Skin:     General: Skin is warm and dry.      Findings: No rash.      Comments: Seborrheic keratosis right upper chest, skin tag right inner groin    Neurological:      General: No focal deficit present.      Mental Status: She is alert and oriented to person, place, and time.      Cranial Nerves: No cranial nerve deficit.      Sensory: No sensory deficit.      Motor: No weakness.      Deep Tendon Reflexes: Reflexes are normal and symmetric.   Psychiatric:         Mood and Affect: Mood normal.         Behavior: Behavior normal.         Thought Content: Thought content normal.         Judgment: Judgment normal.                ASSESSMENT/PLAN:   1. Routine physical examination  - CBC With Differential With Platelet; Future  - Comp Metabolic Panel (14); Future  - Lipid Panel; Future    2. Hair loss  - TSH W Reflex To Free T4 [E]; Future  - Cortisol [E]; Future  - Ferritin [E]; Future  - Iron And Tibc [E]; Future  - Vitamin D [E]; Future    3. Insomnia, unspecified type  - TSH W Reflex To Free T4 [E]; Future  - Cortisol [E]; Future    4. Weight gain  - TSH W Reflex To Free T4 [E]; Future  - Cortisol [E]; Future    5. Urinary frequency  - Hemoglobin A1C [E]; Future  - Urinalysis with Culture Reflex; Future    6. Skin tag  - Derm Referral - In Network    7. Skin lesion  - Derm Referral - In Network    8. Eustachian tube dysfunction, right  - Take Flonase nasal spray, 2 sprays each nostril daily   - Discussed eustachian  tube dysfunction exercises      Orders Placed This  Encounter   Procedures    CBC With Differential With Platelet    Comp Metabolic Panel (14)    Lipid Panel    TSH W Reflex To Free T4 [E]    Hemoglobin A1C [E]    Urinalysis with Culture Reflex    Cortisol [E]    Ferritin [E]    Iron And Tibc [E]    Vitamin D [E]       Meds This Visit:  Requested Prescriptions      No prescriptions requested or ordered in this encounter       Imaging & Referrals:  [facility] - INTERNAL         ID#[id number]

## 2024-09-26 ENCOUNTER — TELEPHONE (OUTPATIENT)
Dept: OBGYN CLINIC | Facility: CLINIC | Age: 29
End: 2024-09-26

## 2024-09-26 NOTE — TELEPHONE ENCOUNTER
Patient was seen on 5/14 for an annual. A pap was not done at that appointment. She is concerned that one needs to be done because last year's results required a colposcopy. Notes from 5/14 state last pap was in 2021. Please call to discuss and advise.

## 2024-09-26 NOTE — TELEPHONE ENCOUNTER
Per pt she will be going to an apt with her primary care physician next month where she will be requesting a pap. No further questions.

## 2024-10-02 ENCOUNTER — OFFICE VISIT (OUTPATIENT)
Dept: ENDOCRINOLOGY CLINIC | Facility: CLINIC | Age: 29
End: 2024-10-02
Payer: COMMERCIAL

## 2024-10-02 VITALS
DIASTOLIC BLOOD PRESSURE: 67 MMHG | BODY MASS INDEX: 27.64 KG/M2 | HEART RATE: 86 BPM | HEIGHT: 66.5 IN | SYSTOLIC BLOOD PRESSURE: 101 MMHG | WEIGHT: 174 LBS

## 2024-10-02 DIAGNOSIS — R63.5 WEIGHT GAIN: ICD-10-CM

## 2024-10-02 DIAGNOSIS — R79.89 ELEVATED CORTISOL LEVEL: ICD-10-CM

## 2024-10-02 DIAGNOSIS — R80.9 PROTEINURIA, UNSPECIFIED TYPE: ICD-10-CM

## 2024-10-02 DIAGNOSIS — R53.83 FATIGUE, UNSPECIFIED TYPE: Primary | ICD-10-CM

## 2024-10-02 DIAGNOSIS — N92.6 IRREGULAR MENSES: ICD-10-CM

## 2024-10-02 DIAGNOSIS — L70.9 ACNE, UNSPECIFIED ACNE TYPE: ICD-10-CM

## 2024-10-02 PROCEDURE — 99204 OFFICE O/P NEW MOD 45 MIN: CPT | Performed by: INTERNAL MEDICINE

## 2024-10-02 NOTE — H&P
New Patient Evaluation - History and Physical    CONSULT - Reason for Visit:  High Cortisol.  Requesting Provider: JOSIE Ibrahim.    CHIEF COMPLAINT:    Chief Complaint   Patient presents with    Consult     Cortisol level, weight gain 3 months, headaches, insomnia        HISTORY OF PRESENT ILLNESS:   Paz Paz is a 29 year old female who presents with elevated cortisol. In addition, she had been having proteinuria.     She is currently on birth control, and has skipped her cycle recently.     Menstrual History    Age of Menarche: 12  Regular/ Irregular: regular, with warning symptoms, started on the pill for severe cramping  OC: yes Type: combined Currently On: yes  Sexually Active: yes  Family History of Infertility: yes  LMP: 8/27/24  : 7/30/24    She is being treated for acne and rosacea; She missed April and May. She has had protein in her urine.     She has very severe migraines preceding her cycles. She finds relief with Excedrin. 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    She works as a  for children.     PAST MEDICAL HISTORY:   Past Medical History:    Constipation    Migraine    Rosacea    Scoliosis       PAST SURGICAL HISTORY:   Past Surgical History:   Procedure Laterality Date    Cosmetic surgery      liposunction x 3 times    [place] teeth removed         SOCIAL HISTORY:    Social History     Socioeconomic History    Marital status: Single   Tobacco Use    Smoking status: Never    Smokeless tobacco: Never   Vaping Use    Vaping status: Never Used   Substance and Sexual Activity    Alcohol use: Yes     Comment: social    Drug use: Not Currently     Social Determinants of Health     Financial Resource Strain: Not on File (10/6/2022)    Received from [facility]    Financial Resource Strain     Financial Resource Strain: 0   Food Insecurity: Not on File (9/26/2024)    [facility] from [facility]  Yes  Can send new rx? What is the name of the pharmacy?    Food Insecurity     Food: 0   Transportation Needs: Low Risk  (10/9/2023)    Received from [facility]    Transportation Needs     Do you have trouble getting transportation to medical appointments?: No     How do you normally get to and from your appointments?: Other   Physical Activity: Not on File (10/6/2022)    Received from [facility]    Physical Activity     Physical Activity: 0   Stress: Not on File (10/6/2022)    Received from [facility]    Stress     Stress: 0   Social Connections: Not on File (9/13/2024)    Received from [facility]    Social Connections     Connectedness: 0       FAMILY HISTORY:   No family history on file.    CURRENT MEDICATIONS:    Current Outpatient Medications   Medication Sig Dispense Refill    MAGNESIUM CITRATE OR Take by mouth at bedtime.      BLISOVI FE 1/20 1-20 MG-MCG Oral Tab Take 1 tablet by mouth daily. 84 tablet 3    spironolactone 50 MG Oral Tab Take 2 tablets (100 mg total) by mouth daily.         ALLERGIES:  Allergies   Allergen Reactions    Latex HIVES, ITCHING, RASH and SWELLING         ASSESSMENTS:     REVIEW OF SYSTEMS:  Constitutional: Negative for: weight change, fever, fatigue, cold/heat intolerance  Eyes: Negative for:  Visual changes, proptosis, blurring  ENT: Negative for:  dysphagia, neck swelling, dysphonia  Respiratory: Negative for:  dyspnea, cough  Cardiovascular: Negative for:  chest pain, palpitations, orthopnea  GI: Negative for:  abdominal pain, nausea, vomiting, diarrhea, constipation, bleeding  Neurology: Negative for: headache, numbness, weakness  Genito-Urinary: Negative for: dysuria, frequency  Psychiatric: Negative for:  depression, anxiety  Hematology/Lymphatics: Negative for: bruising, lower extremity edema  Endocrine: Negative for: polyuria, polydypsia  Skin: Negative for: rash, blister, cellulitis,      PHYSICAL EXAM:   Vitals:    10/02/24 1033   BP: 101/67   Pulse: 86   Weight: 174 lb (78.9 kg)   Height: 5' 6.5\" (1.689  m)     BMI: Body mass index is 27.66 kg/m².     CONSTITUTIONAL:  awake, alert, cooperative, no apparent distress, and appears stated age  PSYCH: normal affect  EYES:  No proptosis, no ptosis, conjunctiva normal  SKIN:  no bruising or bleeding, no rashes and no lesions  EXTREMITIES: no edema      DATA:   Reviewed    ASSESSMENT AND PLAN: This is a 29 year-old woman here for evaluation and management of recent weight gain and development of irregular menstrual cycles. She also has a history of acne and being treated successfully with spironolactone with this.     I would like to complete a full hormonal and metabolic work up and then follow up with her in clinic in a few weeks.     Prior to this encounter, I spent over 20 minutes with preparing for the visit, reviewing documents from other specialties as well as from PCP, and going over test results and imaging studies. During the face to face encounter, I spent an additional 30 minutes which were determined for history-taking, physical examination, and orientation regarding our services. Greater than 50% of the time was spent in counseling, anticipatory guidance, and coordination of care. Patient concerns were answered to the best of my knowledge.         10/2/2024  Erin Hyde MD

## 2024-10-03 ENCOUNTER — OFFICE VISIT (OUTPATIENT)
Dept: DERMATOLOGY CLINIC | Facility: CLINIC | Age: 29
End: 2024-10-03

## 2024-10-03 ENCOUNTER — APPOINTMENT (OUTPATIENT)
Dept: LAB | Facility: HOSPITAL | Age: 29
End: 2024-10-03
Attending: INTERNAL MEDICINE
Payer: COMMERCIAL

## 2024-10-03 DIAGNOSIS — L91.8 SKIN TAG: ICD-10-CM

## 2024-10-03 DIAGNOSIS — L70.0 ACNE VULGARIS: Primary | ICD-10-CM

## 2024-10-03 DIAGNOSIS — L82.1 SEBORRHEIC KERATOSIS: ICD-10-CM

## 2024-10-03 PROCEDURE — 82530 CORTISOL FREE: CPT

## 2024-10-03 PROCEDURE — 11200 RMVL SKIN TAGS UP TO&INC 15: CPT | Performed by: PHYSICIAN ASSISTANT

## 2024-10-03 PROCEDURE — 17110 DESTRUCTION B9 LES UP TO 14: CPT | Performed by: PHYSICIAN ASSISTANT

## 2024-10-03 PROCEDURE — 99203 OFFICE O/P NEW LOW 30 MIN: CPT | Performed by: PHYSICIAN ASSISTANT

## 2024-10-03 PROCEDURE — 82570 ASSAY OF URINE CREATININE: CPT

## 2024-10-03 RX ORDER — SPIRONOLACTONE 100 MG/1
100 TABLET, FILM COATED ORAL DAILY
Qty: 90 TABLET | Refills: 3 | Status: SHIPPED | OUTPATIENT
Start: 2024-10-03

## 2024-10-03 NOTE — PROGRESS NOTES
HPI:    Patient ID: Paz Paz is a 29 year old female.    Patient in for refills for acne, currently on Spironolactone 100 mg. Was started on this by previous dermatologist with success. Has been on it for 8 years. No side effects. She is currently on OCPs. She takes daily, with improvements. Has a family hx of skin cancer. No draining or tenderness noted. Hx of allergies to medications noted.         Review of Systems   Constitutional:  Negative for chills and fever.   Musculoskeletal:  Negative for arthralgias and myalgias.   Skin:  Positive for rash. Negative for color change and wound.            Current Outpatient Medications   Medication Sig Dispense Refill    spironolactone 100 MG Oral Tab Take 1 tablet (100 mg total) by mouth daily. Take 1 tablet daily for 1 week, then 2 tablets daily for 1 week then 3 tablets daily. 90 tablet 3    MAGNESIUM CITRATE OR Take by mouth at bedtime.      BLISOVI FE 1/20 1-20 MG-MCG Oral Tab Take 1 tablet by mouth daily. 84 tablet 3    spironolactone 50 MG Oral Tab Take 2 tablets (100 mg total) by mouth daily.       Allergies:  Allergies   Allergen Reactions    Latex HIVES, ITCHING, RASH and SWELLING      LMP 08/26/2024 (Approximate)   There is no height or weight on file to calculate BMI.  PHYSICAL EXAM:   Physical Exam  Constitutional:       General: She is not in acute distress.     Appearance: Normal appearance.   Skin:     General: Skin is warm and dry.      Findings: Rash present.      Comments: Sk noted on the chest. No draining or tenderness noted. No scaling noted. No erythema noted.     Face is clear. No papules or pustules noted. No draining or tenderness noted. No cystic lesions noted.     Skin tag noted on the right side of the groin. About 2 mm in size. Pedunculated lesion about 3 mm in size.    Neurological:      Mental Status: She is alert and oriented to person, place, and time.                ASSESSMENT/PLAN:   1. Acne vulgaris  -After discussion with  patient, advised the following:  -Potassium is normal   -Continue aldactone  -Continue birth control   -To call or follow-up with worsening symptoms or concerns.   -Pt was agreeable to plan and will comply with discussion above.       2. Skin tag  -After discussion with patient, advised the following:  -Skin tags removed  -See procedure note  -Care instructions given  -To call or follow-up with worsening symptoms or concerns  -Pt was agreeable to plan and will comply with discussion above.     3. Seborrheic keratosis  -After discussion with patient, advised the following:  - Cryosurgery of non-malignant lesion(s)  - Risks, benefits, alternatives and personnel required for cryosurgery reviewed with patient. Discussed the expected redness, as well as the risks of swelling, blistering, crusting, pigmentary changes, and permanent scarring. . Discussed that lesion may need additional treatments in future or may recur. Pt verbalizes understanding and wishes to proceed.   - Cryosurgery performed with Liquid Nitrogen via cryostat spray gun to Seborrheic Keratosis. 1 lesion(s) treated.   - Patient tolerated well and wound care discussed.   -Pt was agreeable to plan and will comply with discussion above.           No orders of the defined types were placed in this encounter.      Meds This Visit:  Requested Prescriptions     Signed Prescriptions Disp Refills    spironolactone 100 MG Oral Tab 90 tablet 3     Sig: Take 1 tablet (100 mg total) by mouth daily. Take 1 tablet daily for 1 week, then 2 tablets daily for 1 week then 3 tablets daily.       Imaging & Referrals:  None         ID#[id number]

## 2024-10-03 NOTE — PROCEDURES
Procedure:  Skin tag removal  With the patient is a supine position, the skin was scrubbed with alcohol.  Anesthesia was obtained by injecting 2 mL of 1% Xylocaine with Epinephrine around the right groin area. Scissors and forceps were used to remove the skin tags. Skin tags were not sent for histopathology. Hemostasis achieved with cautery and/or aluminum chloride.     The estimated blood loss was < 1mL.     Clinical Dx & Size of lesion(s):    Skin tags were about 2-3 mm in size. Total of 1 skin tags were removed.      Paz tolerated the procedure well.  Complications:  none

## 2024-10-04 ENCOUNTER — LAB ENCOUNTER (OUTPATIENT)
Dept: LAB | Facility: HOSPITAL | Age: 29
End: 2024-10-04
Attending: INTERNAL MEDICINE
Payer: COMMERCIAL

## 2024-10-04 ENCOUNTER — TELEPHONE (OUTPATIENT)
Dept: ENDOCRINOLOGY CLINIC | Facility: CLINIC | Age: 29
End: 2024-10-04

## 2024-10-04 DIAGNOSIS — R79.89 ELEVATED CORTISOL LEVEL: Primary | ICD-10-CM

## 2024-10-04 DIAGNOSIS — R79.89 ELEVATED CORTISOL LEVEL: ICD-10-CM

## 2024-10-04 DIAGNOSIS — R53.83 FATIGUE, UNSPECIFIED TYPE: ICD-10-CM

## 2024-10-04 DIAGNOSIS — R80.9 PROTEINURIA, UNSPECIFIED TYPE: ICD-10-CM

## 2024-10-04 DIAGNOSIS — R63.5 WEIGHT GAIN: ICD-10-CM

## 2024-10-04 DIAGNOSIS — N92.6 IRREGULAR MENSES: ICD-10-CM

## 2024-10-04 LAB
ALBUMIN SERPL-MCNC: 4.6 G/DL (ref 3.2–4.8)
ALBUMIN/GLOB SERPL: 1.5 {RATIO} (ref 1–2)
ALP LIVER SERPL-CCNC: 71 U/L
ALT SERPL-CCNC: 9 U/L
ANION GAP SERPL CALC-SCNC: 7 MMOL/L (ref 0–18)
AST SERPL-CCNC: 14 U/L (ref ?–34)
BASOPHILS # BLD AUTO: 0.02 X10(3) UL (ref 0–0.2)
BASOPHILS NFR BLD AUTO: 0.3 %
BILIRUB SERPL-MCNC: 0.5 MG/DL (ref 0.3–1.2)
BUN BLD-MCNC: 18 MG/DL (ref 9–23)
BUN/CREAT SERPL: 21.7 (ref 10–20)
CALCIUM BLD-MCNC: 9.5 MG/DL (ref 8.7–10.4)
CHLORIDE SERPL-SCNC: 107 MMOL/L (ref 98–112)
CHOLEST SERPL-MCNC: 216 MG/DL (ref ?–200)
CO2 SERPL-SCNC: 24 MMOL/L (ref 21–32)
CORTIS SERPL-MCNC: 32.9 UG/DL
CREAT BLD-MCNC: 0.83 MG/DL
CREAT UR-SCNC: 1.25 G/24 HR (ref 0.6–1.8)
CREAT UR-SCNC: 82.2 MG/DL
DEPRECATED HBV CORE AB SER IA-ACNC: 169 NG/ML
DEPRECATED RDW RBC AUTO: 37.2 FL (ref 35.1–46.3)
DHEA-S SERPL-MCNC: 91.2 UG/DL
EGFRCR SERPLBLD CKD-EPI 2021: 98 ML/MIN/1.73M2 (ref 60–?)
EOSINOPHIL # BLD AUTO: 0.05 X10(3) UL (ref 0–0.7)
EOSINOPHIL NFR BLD AUTO: 0.7 %
ERYTHROCYTE [DISTWIDTH] IN BLOOD BY AUTOMATED COUNT: 11.6 % (ref 11–15)
EST. AVERAGE GLUCOSE BLD GHB EST-MCNC: 105 MG/DL (ref 68–126)
ESTRADIOL SERPL-MCNC: 26.3 PG/ML
FASTING PATIENT LIPID ANSWER: YES
FASTING STATUS PATIENT QL REPORTED: YES
FSH SERPL-ACNC: 4.6 MIU/ML
GLOBULIN PLAS-MCNC: 3 G/DL (ref 2–3.5)
GLUCOSE BLD-MCNC: 92 MG/DL (ref 70–99)
HBA1C MFR BLD: 5.3 % (ref ?–5.7)
HCT VFR BLD AUTO: 39.7 %
HDLC SERPL-MCNC: 58 MG/DL (ref 40–59)
HGB BLD-MCNC: 13.7 G/DL
IMM GRANULOCYTES # BLD AUTO: 0.03 X10(3) UL (ref 0–1)
IMM GRANULOCYTES NFR BLD: 0.4 %
INSULIN SERPL-ACNC: 15 MU/L (ref 3–25)
IRON SATN MFR SERPL: 21 %
IRON SERPL-MCNC: 86 UG/DL
LDLC SERPL CALC-MCNC: 131 MG/DL (ref ?–100)
LH SERPL-ACNC: 5.8 MIU/ML
LYMPHOCYTES # BLD AUTO: 1.9 X10(3) UL (ref 1–4)
LYMPHOCYTES NFR BLD AUTO: 26.2 %
MCH RBC QN AUTO: 30.1 PG (ref 26–34)
MCHC RBC AUTO-ENTMCNC: 34.5 G/DL (ref 31–37)
MCV RBC AUTO: 87.3 FL
MICROALBUMIN UR-MCNC: <0.3 MG/DL
MONOCYTES # BLD AUTO: 0.54 X10(3) UL (ref 0.1–1)
MONOCYTES NFR BLD AUTO: 7.4 %
NEUTROPHILS # BLD AUTO: 4.71 X10 (3) UL (ref 1.5–7.7)
NEUTROPHILS # BLD AUTO: 4.71 X10(3) UL (ref 1.5–7.7)
NEUTROPHILS NFR BLD AUTO: 65 %
NONHDLC SERPL-MCNC: 158 MG/DL (ref ?–130)
OSMOLALITY SERPL CALC.SUM OF ELEC: 288 MOSM/KG (ref 275–295)
PLATELET # BLD AUTO: 294 10(3)UL (ref 150–450)
POTASSIUM SERPL-SCNC: 4.1 MMOL/L (ref 3.5–5.1)
PROGEST SERPL-MCNC: 0.37 NG/ML
PROLACTIN SERPL-MCNC: 13.7 NG/ML
PROT SERPL-MCNC: 7.6 G/DL (ref 5.7–8.2)
RBC # BLD AUTO: 4.55 X10(6)UL
SODIUM SERPL-SCNC: 138 MMOL/L (ref 136–145)
SPECIMEN VOL UR: 1400 ML
T3FREE SERPL-MCNC: 3.21 PG/ML (ref 2.4–4.2)
T4 FREE SERPL-MCNC: 1 NG/DL (ref 0.8–1.7)
TIBC SERPL-MCNC: 408 UG/DL (ref 250–425)
TRANSFERRIN SERPL-MCNC: 274 MG/DL (ref 250–380)
TRIGL SERPL-MCNC: 155 MG/DL (ref 30–149)
TSI SER-ACNC: 3.46 MIU/ML (ref 0.55–4.78)
VIT B12 SERPL-MCNC: 359 PG/ML (ref 211–911)
VIT D+METAB SERPL-MCNC: 27.6 NG/ML (ref 30–100)
VLDLC SERPL CALC-MCNC: 28 MG/DL (ref 0–30)
WBC # BLD AUTO: 7.3 X10(3) UL (ref 4–11)

## 2024-10-04 PROCEDURE — 80053 COMPREHEN METABOLIC PANEL: CPT

## 2024-10-04 PROCEDURE — 82670 ASSAY OF TOTAL ESTRADIOL: CPT

## 2024-10-04 PROCEDURE — 84146 ASSAY OF PROLACTIN: CPT

## 2024-10-04 PROCEDURE — 84466 ASSAY OF TRANSFERRIN: CPT | Performed by: INTERNAL MEDICINE

## 2024-10-04 PROCEDURE — 83540 ASSAY OF IRON: CPT | Performed by: INTERNAL MEDICINE

## 2024-10-04 PROCEDURE — 82728 ASSAY OF FERRITIN: CPT | Performed by: INTERNAL MEDICINE

## 2024-10-04 PROCEDURE — 83002 ASSAY OF GONADOTROPIN (LH): CPT

## 2024-10-04 PROCEDURE — 82570 ASSAY OF URINE CREATININE: CPT

## 2024-10-04 PROCEDURE — 80061 LIPID PANEL: CPT

## 2024-10-04 PROCEDURE — 83525 ASSAY OF INSULIN: CPT | Performed by: INTERNAL MEDICINE

## 2024-10-04 PROCEDURE — 82530 CORTISOL FREE: CPT

## 2024-10-04 PROCEDURE — 36415 COLL VENOUS BLD VENIPUNCTURE: CPT

## 2024-10-04 PROCEDURE — 82306 VITAMIN D 25 HYDROXY: CPT

## 2024-10-04 PROCEDURE — 82627 DEHYDROEPIANDROSTERONE: CPT | Performed by: INTERNAL MEDICINE

## 2024-10-04 PROCEDURE — 82533 TOTAL CORTISOL: CPT

## 2024-10-04 PROCEDURE — 84439 ASSAY OF FREE THYROXINE: CPT | Performed by: INTERNAL MEDICINE

## 2024-10-04 PROCEDURE — 83036 HEMOGLOBIN GLYCOSYLATED A1C: CPT

## 2024-10-04 PROCEDURE — 84443 ASSAY THYROID STIM HORMONE: CPT | Performed by: INTERNAL MEDICINE

## 2024-10-04 PROCEDURE — 83520 IMMUNOASSAY QUANT NOS NONAB: CPT | Performed by: INTERNAL MEDICINE

## 2024-10-04 PROCEDURE — 82024 ASSAY OF ACTH: CPT

## 2024-10-04 PROCEDURE — 84410 TESTOSTERONE BIOAVAILABLE: CPT | Performed by: INTERNAL MEDICINE

## 2024-10-04 PROCEDURE — 84143 ASSAY OF 17-HYDROXYPREGNENO: CPT

## 2024-10-04 PROCEDURE — 84144 ASSAY OF PROGESTERONE: CPT | Performed by: INTERNAL MEDICINE

## 2024-10-04 PROCEDURE — 83001 ASSAY OF GONADOTROPIN (FSH): CPT

## 2024-10-04 PROCEDURE — 82043 UR ALBUMIN QUANTITATIVE: CPT

## 2024-10-04 PROCEDURE — 84481 FREE ASSAY (FT-3): CPT | Performed by: INTERNAL MEDICINE

## 2024-10-04 PROCEDURE — 82607 VITAMIN B-12: CPT | Performed by: INTERNAL MEDICINE

## 2024-10-04 PROCEDURE — 85025 COMPLETE CBC W/AUTO DIFF WBC: CPT

## 2024-10-04 NOTE — TELEPHONE ENCOUNTER
Spoke with pt. States she was under the impression that she would have to do a 24 hour urine collection therefore was able to receive a 24 hour urine specimen container from the lab. Confirmed that pt discarded first morning urine and collected remaining urine in container for 24 hours.     Per Dr Hyde's instructions, lab orders placed for 24 hour urine creatinine and cortisol. Called and notified lab to run these tests.     Pt notified of this, no further questions.

## 2024-10-05 LAB — ACTH: 42.3 PG/ML

## 2024-10-07 LAB — MULLERIAN AMH: 0.6 NG/ML

## 2024-10-08 LAB
CORTISOL FREE 24H UR: 17 UG/24 HR
CORTISOL FREE UR: 12 UG/L
CREAT 24H UR: 1350 MG/24 HR
CREAT UR: 96.4 MG/DL

## 2024-10-11 LAB
SEX HORM BIND GLOB: 52.4 NMOL/L
TESTOST % FREE+WEAK BND: 27.2 %
TESTOST FREE+WEAK BND: 2.5 NG/DL
TESTOSTERONE TOT /MS: 9.1 NG/DL

## 2024-10-14 LAB — 17-OH PROGESTERONE: 37 NG/DL

## 2024-10-17 ENCOUNTER — APPOINTMENT (OUTPATIENT)
Dept: OTHER | Facility: HOSPITAL | Age: 29
End: 2024-10-17
Attending: EMERGENCY MEDICINE

## 2024-10-17 LAB — Lab: 1.59 UG/DL

## 2024-10-23 ENCOUNTER — OFFICE VISIT (OUTPATIENT)
Dept: ENDOCRINOLOGY CLINIC | Facility: CLINIC | Age: 29
End: 2024-10-23
Payer: COMMERCIAL

## 2024-10-23 ENCOUNTER — APPOINTMENT (OUTPATIENT)
Dept: OTHER | Facility: HOSPITAL | Age: 29
End: 2024-10-23
Attending: EMERGENCY MEDICINE

## 2024-10-23 VITALS
BODY MASS INDEX: 27.79 KG/M2 | DIASTOLIC BLOOD PRESSURE: 66 MMHG | HEIGHT: 66.5 IN | SYSTOLIC BLOOD PRESSURE: 102 MMHG | WEIGHT: 175 LBS | HEART RATE: 89 BPM

## 2024-10-23 DIAGNOSIS — R79.89 ELEVATED CORTISOL LEVEL: ICD-10-CM

## 2024-10-23 DIAGNOSIS — N92.6 IRREGULAR MENSES: Primary | ICD-10-CM

## 2024-10-23 PROCEDURE — 99214 OFFICE O/P EST MOD 30 MIN: CPT | Performed by: INTERNAL MEDICINE

## 2024-10-23 NOTE — PROGRESS NOTES
Follow-up- Reason for Visit:  High Cortisol.  Requesting Provider: JOSIE Ibrahim.    CHIEF COMPLAINT:    Chief Complaint   Patient presents with    Follow - Up     Test results         HISTORY OF PRESENT ILLNESS:   Paz Paz is a 29 year old female who presents with elevated cortisol. In addition, she had been having proteinuria.     She is currently on birth control, and has skipped many cycles recently.     Menstrual History    Age of Menarche: 12  Regular/ Irregular: regular, with warning symptoms, started on the pill for severe cramping  OC: yes Type: combined Currently On: yes  Sexually Active: yes  Family History of Infertility: yes  LMP: 8/27/24  : 7/30/24    She is being treated for acne and rosacea; She missed April and May. She has had protein in her urine.     She has very severe migraines preceding her cycles. She finds relief with Excedrin. 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 She was also told that this no longer needed to be followed.     She works as a  for children.     She is also concerned about hair loss. I had explained to her that we would perform blood tests to evaluate for elevated cortisol as well as irregular menstrual cycles.     She is here to go over the test results.     PAST MEDICAL HISTORY:   Past Medical History:    Constipation    Migraine    Rosacea    Scoliosis       PAST SURGICAL HISTORY:   Past Surgical History:   Procedure Laterality Date    Cosmetic surgery      liposunction x 3 times    [place] teeth removed         SOCIAL HISTORY:    Social History     Socioeconomic History    Marital status: Single   Tobacco Use    Smoking status: Never    Smokeless tobacco: Never   Vaping Use    Vaping status: Never Used   Substance and Sexual Activity    Alcohol use: Yes     Comment: social    Drug use: Not Currently   Other Topics Concern    Grew up on a farm No     History of tanning No    Outdoor occupation No    Breast feeding No    Reaction to local anesthetic No    Pt has a pacemaker No    Pt has a defibrillator No     Social Drivers of Health     Financial Resource Strain: Not on File (10/6/2022)    [facility]    Financial Resource Strain     Financial Resource Strain: 0   Food Insecurity: Not on File (9/26/2024)    Received from [facility]    Food Insecurity     Food: 0   Transportation Needs: Low Risk  (10/9/2023)    Received from [facility]    Transportation Needs     Do you have trouble getting transportation to medical appointments?: No     How do you normally get to and from your appointments?: Other   Physical Activity: Not on File (10/6/2022)    [facility]    Physical Activity     Physical Activity: 0   Stress: Not on File (10/6/2022)    [facility]    Stress     Stress: 0   Social Connections: Not on File (9/13/2024)    Received from [facility]    Social Connections     Connectedness: 0       FAMILY HISTORY:   No family history on file.    CURRENT MEDICATIONS:    Current Outpatient Medications   Medication Sig Dispense Refill    spironolactone 100 MG Oral Tab Take 1 tablet (100 mg total) by mouth daily. Take 1 tablet daily for 1 week, then 2 tablets daily for 1 week then 3 tablets daily. 90 tablet 3    MAGNESIUM CITRATE OR Take by mouth at bedtime.      BLISOVI FE 1/20 1-20 MG-MCG Oral Tab Take 1 tablet by mouth daily. 84 tablet 3    spironolactone 50 MG Oral Tab Take 2 tablets (100 mg total) by mouth daily.         ALLERGIES:  Allergies   Allergen Reactions    Latex HIVES, ITCHING, RASH and SWELLING         ASSESSMENTS:     REVIEW OF SYSTEMS:  Constitutional: Negative for: weight change, fever, fatigue, cold/heat intolerance  Eyes: Negative for:  Visual changes, proptosis, blurring  ENT: Negative for:  dysphagia, neck swelling, dysphonia  Respiratory: Negative for:  dyspnea, cough  Cardiovascular:  Negative for:  chest pain, palpitations, orthopnea  GI: Negative for:  abdominal pain, nausea, vomiting, diarrhea, constipation, bleeding  Neurology: Negative for: headache, numbness, weakness  Genito-Urinary: Negative for: dysuria, frequency  Psychiatric: Negative for:  depression, anxiety  Hematology/Lymphatics: Negative for: bruising, lower extremity edema  Endocrine: Negative for: polyuria, polydypsia  Skin: Negative for: rash, blister, cellulitis,      PHYSICAL EXAM:   Vitals:    10/23/24 1106   BP: 102/66   Pulse: 89   Weight: 175 lb (79.4 kg)   Height: 5' 6.5\" (1.689 m)     BMI: Body mass index is 27.82 kg/m².     CONSTITUTIONAL:  awake, alert, cooperative, no apparent distress, and appears stated age  PSYCH: normal affect  EYES:  No proptosis, no ptosis, conjunctiva normal  SKIN:  no bruising or bleeding, no rashes and no lesions  EXTREMITIES: no edema      DATA:   Reviewed    ASSESSMENT AND PLAN: This is a 29 year-old woman here for follow-up of management of recent weight gain and development of irregular menstrual cycles as well as hair loss. She also has a history of acne and being treated successfully with spironolactone with this.     We see that both total cortisol as well as free cortisol are elevated. 24-hour urinary cortisol level is normal. I would like to recheck these while patient has been off of OCPs for 3 months. She will contact me at that time. We should also recheck [facility]. In addition, she should have a repeat MRI imaging the pineal gland at some time.     Prior to this encounter, I spent over 15 minutes with preparing for the visit, including reviewing documents from other specialties as well as from PCP and going over test results and imaging studies. During the face to face encounter, I spent an additional 15 minutes which were determined for follow-up. Greater than 50% of the time was spent in counseling, anticipatory guidance, and coordination of care. Patient concerns were answered to  the best of my knowledge.         10/23/24  Erin Hyde MD

## 2024-10-24 ENCOUNTER — OFFICE VISIT (OUTPATIENT)
Dept: FAMILY MEDICINE CLINIC | Facility: CLINIC | Age: 29
End: 2024-10-24

## 2024-10-24 VITALS
DIASTOLIC BLOOD PRESSURE: 66 MMHG | BODY MASS INDEX: 27.32 KG/M2 | SYSTOLIC BLOOD PRESSURE: 97 MMHG | WEIGHT: 172 LBS | HEIGHT: 66.5 IN | HEART RATE: 106 BPM

## 2024-10-24 DIAGNOSIS — Z12.4 SCREENING FOR CERVICAL CANCER: Primary | ICD-10-CM

## 2024-10-24 DIAGNOSIS — R79.89 ELEVATED CORTISOL LEVEL: ICD-10-CM

## 2024-10-24 DIAGNOSIS — T50.Z95A ADVERSE EFFECT OF VACCINE, INITIAL ENCOUNTER: ICD-10-CM

## 2024-10-24 PROCEDURE — 99213 OFFICE O/P EST LOW 20 MIN: CPT | Performed by: STUDENT IN AN ORGANIZED HEALTH CARE EDUCATION/TRAINING PROGRAM

## 2024-10-24 RX ORDER — CYCLOBENZAPRINE HCL 5 MG
TABLET ORAL
COMMUNITY
Start: 2024-10-17

## 2024-10-24 NOTE — PROGRESS NOTES
HPI:    Patient ID: Paz Paz is a 29 year old female.    HPI  Pt presenting for Pap testing.    Last Pap 2023 followed by Kaitlin    Reports body changes over the last 8 months  Endorses facial puffiness, increased hair fall, abd weight gain  Recent labs notable for elevated cortisol, protein in urine  Recently seen by Endo: plan for birth control free for 3 months and retest    Notes prior COVID vaccine with poor reaction  Subsequent flu shots with pronounced reaction  Sick for 1 week, including paresthesias, vomiting    Works as L&D RN at [employer]      Review of Systems   A comprehensive 10 point review of systems was completed.  Pertinent positives and negatives noted in the the HPI.       Current Outpatient Medications   Medication Sig Dispense Refill    cyclobenzaprine 5 MG Oral Tab TAKE 1-2 TABLETS 2 HOURS BEFORE BEDTIME AS NEEDED      spironolactone 100 MG Oral Tab Take 1 tablet (100 mg total) by mouth daily. Take 1 tablet daily for 1 week, then 2 tablets daily for 1 week then 3 tablets daily. 90 tablet 3    MAGNESIUM CITRATE OR Take by mouth at bedtime.      BLISOVI FE 1/20 1-20 MG-MCG Oral Tab Take 1 tablet by mouth daily. 84 tablet 3     Allergies:Allergies[1]   Vitals:    10/24/24 1633   BP: 97/66   Pulse: 106   Weight: 172 lb (78 kg)   Height: 5' 6.5\" (1.689 m)       Body mass index is 27.35 kg/m².   PHYSICAL EXAM:   Physical Exam  Vitals reviewed.   Constitutional:       General: She is not in acute distress.     Appearance: Normal appearance. She is well-developed.   HENT:      Head: Normocephalic and atraumatic.      Right Ear: External ear normal.      Left Ear: External ear normal.   Eyes:      Conjunctiva/sclera: Conjunctivae normal.   Neck:      Thyroid: No thyroid mass or thyroid tenderness.   Cardiovascular:      Rate and Rhythm: Normal rate and regular rhythm.      Pulses: Normal pulses.      Heart sounds: Normal heart sounds, S1 normal and S2 normal. No murmur heard.  Pulmonary:       Effort: Pulmonary effort is normal. No respiratory distress.      Breath sounds: Normal breath sounds. No wheezing, rhonchi or rales.   Abdominal:      General: Bowel sounds are normal.      Palpations: Abdomen is soft.      Tenderness: There is no abdominal tenderness. There is no guarding or rebound.   Genitourinary:     General: Normal vulva.      Exam position: Lithotomy position.      Vagina: Normal.      Cervix: Normal.      Uterus: Not tender.       Adnexa:         Right: No tenderness.          Left: No tenderness.     Musculoskeletal:      Cervical back: Normal range of motion and neck supple. No muscular tenderness.      Right lower leg: No edema.      Left lower leg: No edema.   Lymphadenopathy:      Cervical: No cervical adenopathy.   Skin:     General: Skin is warm and dry.      Coloration: Skin is not jaundiced.   Neurological:      General: No focal deficit present.      Mental Status: She is alert and oriented to person, place, and time. Mental status is at baseline.   Psychiatric:         Attention and Perception: Attention normal.         Mood and Affect: Mood normal.         Behavior: Behavior normal. Behavior is cooperative.         Cognition and Memory: Cognition normal.             ASSESSMENT/PLAN:   1. Screening for cervical cancer  Will obtain prior records for review  - ThinPrep PAP with HPV Reflex Request [E]; Future  - ThinPrep PAP with HPV Reflex Request [E]  - ThinPrep PAP with HPV Reflex Request    2. Adverse effect of vaccine, initial encounter  Will plan for flu exemption for now while awaiting Endo workup    3. Elevated cortisol level  Followed by Endo    Pt verbalized understanding and agrees with plan.    Orders Placed This Encounter   Procedures    ThinPrep PAP with HPV Reflex Request [E]    ThinPrep PAP with HPV Reflex Request       Meds This Visit:  Requested Prescriptions      No prescriptions requested or ordered in this encounter       Imaging & Referrals:  None          ID#[id number]         [1]   Allergies  Allergen Reactions    Latex HIVES, ITCHING, RASH and SWELLING

## 2024-10-25 ENCOUNTER — TELEPHONE (OUTPATIENT)
Dept: FAMILY MEDICINE CLINIC | Facility: CLINIC | Age: 29
End: 2024-10-25

## 2024-10-25 NOTE — TELEPHONE ENCOUNTER
Request for patient Pap/Biopsy results faxed/confirmed to     [facility]  [address], [address]  [place] [id number]  Tel: [phone]  Fax: [phone]    Sent for scanning.

## 2024-10-26 ENCOUNTER — TELEPHONE (OUTPATIENT)
Dept: FAMILY MEDICINE CLINIC | Facility: CLINIC | Age: 29
End: 2024-10-26

## 2024-10-26 NOTE — TELEPHONE ENCOUNTER
LM to Pt- vaccine exemption form signed by Dr Ash, ready for  at [facility] . Copy sent for scanning. MyChart also sent.

## 2024-10-26 NOTE — TELEPHONE ENCOUNTER
Patient called back and is asking if it can be sent through [employer] instead.

## 2024-10-29 ENCOUNTER — TELEPHONE (OUTPATIENT)
Dept: FAMILY MEDICINE CLINIC | Facility: CLINIC | Age: 29
End: 2024-10-29

## 2024-10-29 NOTE — TELEPHONE ENCOUNTER
Patient called and is asking to speak to someone in regards to her pap smear, I let her know once doctor reviews them she will be notified.     Can call her back before 4pm

## 2024-11-06 ENCOUNTER — APPOINTMENT (OUTPATIENT)
Dept: OTHER | Facility: HOSPITAL | Age: 29
End: 2024-11-06
Attending: EMERGENCY MEDICINE

## 2024-11-16 ENCOUNTER — HOSPITAL ENCOUNTER (EMERGENCY)
Facility: HOSPITAL | Age: 29
Discharge: HOME | End: 2024-11-16
Attending: EMERGENCY MEDICINE
Payer: COMMERCIAL

## 2024-11-16 ENCOUNTER — APPOINTMENT (OUTPATIENT)
Dept: GENERAL RADIOLOGY | Facility: HOSPITAL | Age: 29
End: 2024-11-16
Attending: EMERGENCY MEDICINE
Payer: COMMERCIAL

## 2024-11-16 VITALS
TEMPERATURE: 98 F | BODY MASS INDEX: 27 KG/M2 | DIASTOLIC BLOOD PRESSURE: 76 MMHG | RESPIRATION RATE: 20 BRPM | SYSTOLIC BLOOD PRESSURE: 105 MMHG | HEART RATE: 94 BPM | OXYGEN SATURATION: 98 % | WEIGHT: 170 LBS

## 2024-11-16 DIAGNOSIS — I47.10 SVT (SUPRAVENTRICULAR TACHYCARDIA) (HCC): Primary | ICD-10-CM

## 2024-11-16 LAB
ALBUMIN SERPL-MCNC: 4.3 G/DL (ref 3.2–4.8)
ALBUMIN/GLOB SERPL: 1.4 {RATIO} (ref 1–2)
ALP LIVER SERPL-CCNC: 77 U/L
ALT SERPL-CCNC: 12 U/L
ANION GAP SERPL CALC-SCNC: 11 MMOL/L (ref 0–18)
AST SERPL-CCNC: 15 U/L (ref ?–34)
ATRIAL RATE: 104 BPM
BASOPHILS # BLD AUTO: 0.02 X10(3) UL (ref 0–0.2)
BASOPHILS NFR BLD AUTO: 0.2 %
BILIRUB SERPL-MCNC: 0.4 MG/DL (ref 0.3–1.2)
BUN BLD-MCNC: 11 MG/DL (ref 9–23)
BUN/CREAT SERPL: 12.8 (ref 10–20)
CALCIUM BLD-MCNC: 9.9 MG/DL (ref 8.7–10.4)
CHLORIDE SERPL-SCNC: 107 MMOL/L (ref 98–112)
CO2 SERPL-SCNC: 21 MMOL/L (ref 21–32)
CREAT BLD-MCNC: 0.86 MG/DL
D DIMER PPP FEU-MCNC: <0.27 UG/ML FEU (ref ?–0.5)
DEPRECATED RDW RBC AUTO: 36.8 FL (ref 35.1–46.3)
EGFRCR SERPLBLD CKD-EPI 2021: 94 ML/MIN/1.73M2 (ref 60–?)
EOSINOPHIL # BLD AUTO: 0.03 X10(3) UL (ref 0–0.7)
EOSINOPHIL NFR BLD AUTO: 0.3 %
ERYTHROCYTE [DISTWIDTH] IN BLOOD BY AUTOMATED COUNT: 11.9 % (ref 11–15)
GLOBULIN PLAS-MCNC: 3.1 G/DL (ref 2–3.5)
GLUCOSE BLD-MCNC: 103 MG/DL (ref 70–99)
HCT VFR BLD AUTO: 43.3 %
HGB BLD-MCNC: 15.2 G/DL
IMM GRANULOCYTES # BLD AUTO: 0.04 X10(3) UL (ref 0–1)
IMM GRANULOCYTES NFR BLD: 0.4 %
LYMPHOCYTES # BLD AUTO: 1.95 X10(3) UL (ref 1–4)
LYMPHOCYTES NFR BLD AUTO: 20.7 %
MAGNESIUM SERPL-MCNC: 1.8 MG/DL (ref 1.6–2.6)
MCH RBC QN AUTO: 29.8 PG (ref 26–34)
MCHC RBC AUTO-ENTMCNC: 35.1 G/DL (ref 31–37)
MCV RBC AUTO: 84.9 FL
MONOCYTES # BLD AUTO: 0.77 X10(3) UL (ref 0.1–1)
MONOCYTES NFR BLD AUTO: 8.2 %
NEUTROPHILS # BLD AUTO: 6.59 X10 (3) UL (ref 1.5–7.7)
NEUTROPHILS # BLD AUTO: 6.59 X10(3) UL (ref 1.5–7.7)
NEUTROPHILS NFR BLD AUTO: 70.2 %
OSMOLALITY SERPL CALC.SUM OF ELEC: 288 MOSM/KG (ref 275–295)
P AXIS: 53 DEGREES
P-R INTERVAL: 222 MS
PLATELET # BLD AUTO: 297 10(3)UL (ref 150–450)
POTASSIUM SERPL-SCNC: 3.8 MMOL/L (ref 3.5–5.1)
PROT SERPL-MCNC: 7.4 G/DL (ref 5.7–8.2)
Q-T INTERVAL: 290 MS
Q-T INTERVAL: 296 MS
Q-T INTERVAL: 316 MS
QRS DURATION: 100 MS
QRS DURATION: 100 MS
QRS DURATION: 64 MS
QTC CALCULATION (BEZET): 415 MS
QTC CALCULATION (BEZET): 442 MS
QTC CALCULATION (BEZET): 448 MS
R AXIS: -16 DEGREES
R AXIS: -20 DEGREES
R AXIS: 10 DEGREES
RBC # BLD AUTO: 5.1 X10(6)UL
SODIUM SERPL-SCNC: 139 MMOL/L (ref 136–145)
T AXIS: 43 DEGREES
T AXIS: 45 DEGREES
T AXIS: 53 DEGREES
TROPONIN I SERPL HS-MCNC: 5 NG/L
TSI SER-ACNC: 1.61 UIU/ML (ref 0.55–4.78)
VENTRICULAR RATE: 104 BPM
VENTRICULAR RATE: 138 BPM
VENTRICULAR RATE: 140 BPM
WBC # BLD AUTO: 9.4 X10(3) UL (ref 4–11)

## 2024-11-16 PROCEDURE — 83735 ASSAY OF MAGNESIUM: CPT | Performed by: EMERGENCY MEDICINE

## 2024-11-16 PROCEDURE — 99285 EMERGENCY DEPT VISIT HI MDM: CPT

## 2024-11-16 PROCEDURE — 85379 FIBRIN DEGRADATION QUANT: CPT | Performed by: EMERGENCY MEDICINE

## 2024-11-16 PROCEDURE — 85025 COMPLETE CBC W/AUTO DIFF WBC: CPT | Performed by: EMERGENCY MEDICINE

## 2024-11-16 PROCEDURE — 71045 X-RAY EXAM CHEST 1 VIEW: CPT | Performed by: EMERGENCY MEDICINE

## 2024-11-16 PROCEDURE — 93005 ELECTROCARDIOGRAM TRACING: CPT

## 2024-11-16 PROCEDURE — 84484 ASSAY OF TROPONIN QUANT: CPT | Performed by: EMERGENCY MEDICINE

## 2024-11-16 PROCEDURE — 96374 THER/PROPH/DIAG INJ IV PUSH: CPT

## 2024-11-16 PROCEDURE — 93010 ELECTROCARDIOGRAM REPORT: CPT

## 2024-11-16 PROCEDURE — 80053 COMPREHEN METABOLIC PANEL: CPT | Performed by: EMERGENCY MEDICINE

## 2024-11-16 PROCEDURE — 96361 HYDRATE IV INFUSION ADD-ON: CPT

## 2024-11-16 PROCEDURE — 84443 ASSAY THYROID STIM HORMONE: CPT | Performed by: EMERGENCY MEDICINE

## 2024-11-16 RX ORDER — DILTIAZEM HYDROCHLORIDE 120 MG/1
120 CAPSULE, EXTENDED RELEASE ORAL DAILY
Status: DISCONTINUED | OUTPATIENT
Start: 2024-11-16 | End: 2024-11-16

## 2024-11-16 RX ORDER — DILTIAZEM HYDROCHLORIDE 120 MG/1
120 CAPSULE, COATED, EXTENDED RELEASE ORAL DAILY
Qty: 30 CAPSULE | Refills: 0 | Status: SHIPPED | OUTPATIENT
Start: 2024-11-16 | End: 2024-11-16

## 2024-11-16 RX ORDER — DILTIAZEM HYDROCHLORIDE 5 MG/ML
10 INJECTION INTRAVENOUS ONCE
Status: COMPLETED | OUTPATIENT
Start: 2024-11-16 | End: 2024-11-16

## 2024-11-16 RX ORDER — DILTIAZEM HYDROCHLORIDE 120 MG/1
120 CAPSULE, EXTENDED RELEASE ORAL ONCE
Status: COMPLETED | OUTPATIENT
Start: 2024-11-16 | End: 2024-11-16

## 2024-11-16 RX ORDER — DILTIAZEM HYDROCHLORIDE EXTENDED-RELEASE TABLETS 120 MG/1
120 TABLET, EXTENDED RELEASE ORAL DAILY
Qty: 30 TABLET | Refills: 0 | Status: SHIPPED | OUTPATIENT
Start: 2024-11-16 | End: 2024-12-16

## 2024-11-16 RX ORDER — DILTIAZEM HYDROCHLORIDE 5 MG/ML
INJECTION INTRAVENOUS
Status: COMPLETED
Start: 2024-11-16 | End: 2024-11-16

## 2024-11-16 NOTE — ED INITIAL ASSESSMENT (HPI)
Patient arrives to ER for evaluation of palpitations. Patient states it has been feeling like it is racing since this AM

## 2024-11-16 NOTE — DISCHARGE INSTRUCTIONS
Return to the emergency department if you develop severe and persistent chest pain, difficulty breathing, dizziness, leg swelling, or if you are coughing up blood, as these can be signs of a medical emergency.  Please call your doctor for a follow-up appointment in 1 to 3 days to determine the need for further testing.  Call the cardiologist for follow up.  Do not take the diltiazem for heart rate less than 60.

## 2024-11-16 NOTE — ED PROVIDER NOTES
Patient Seen in: [facility] Emergency Department      History     Chief Complaint   Patient presents with    Arrythmia/Palpitations     Stated Complaint: tachy    Subjective:   HPI  29-year-old female presents for evaluation of palpitations.  She reports that she is being worked up for Cushing's disease.  She is not currently on any medication.  She woke up with palpitations this morning and her smart watch triggered an alert stating that her heart rate was as high as 190 bpm.  She was concern for SVT and tried vagal maneuvers at home without relief.  She also endorses concurrent chest pain and dyspnea.  No leg swelling.  No recent fever, chills, nausea or vomiting, numbness tingling or weakness of the arms or legs, slurred speech or facial droop, recent hospitalizations, travel, immobilization or OCP use.  Admits to social alcohol use.  No drug use.      Objective:     Past Medical History:    Constipation    Migraine    Rosacea    Scoliosis              Past Surgical History:   Procedure Laterality Date    Cosmetic surgery      liposunction x 3 times    Other surgical history  2020    Liposuction 360, wisdom teeth    Idanha teeth removed                  Social History     Socioeconomic History    Marital status: Single   Tobacco Use    Smoking status: Never    Smokeless tobacco: Never   Vaping Use    Vaping status: Never Used   Substance and Sexual Activity    Alcohol use: Yes     Comment: Socially    Drug use: Never   Other Topics Concern    Grew up on a farm No    History of tanning No    Outdoor occupation No    Breast feeding No    Reaction to local anesthetic No    Pt has a pacemaker No    Pt has a defibrillator No     Social Drivers of Health     Financial Resource Strain: Not on File (10/6/2022)    Received from [facility]    Financial Resource Strain     Financial Resource Strain: 0   Food Insecurity: Not on File (9/26/2024)    Received from [facility]    Food Insecurity     Food: 0   Transportation  Needs: Low Risk  (10/9/2023)    Received from [facility]    Transportation Needs     Do you have trouble getting transportation to medical appointments?: No     How do you normally get to and from your appointments?: Other   Physical Activity: Not on File (10/6/2022)    Received from [facility]    Physical Activity     Physical Activity: 0   Stress: Not on File (10/6/2022)    Received from [facility]    Stress     Stress: 0   Social Connections: Not on File (9/13/2024)    Received from [facility]    Social Connections     Connectedness: 0                  Physical Exam     ED Triage Vitals   BP 11/16/24 1342 122/80   Pulse 11/16/24 1342 (!) 146   Resp 11/16/24 1342 20   Temp 11/16/24 1440 98.1 °F (36.7 °C)   Temp src 11/16/24 1440 Temporal   SpO2 11/16/24 1342 98 %   O2 Device 11/16/24 1342 None (Room air)       Current Vitals:   Vital Signs  BP: 108/83  Pulse: 103  Resp: 16  Temp: 98.1 °F (36.7 °C)  Temp src: Temporal  MAP (mmHg): 91    Oxygen Therapy  SpO2: 96 %  O2 Device: None (Room air)        Physical Exam  Vitals and nursing note reviewed.   Constitutional:       Appearance: She is well-developed.   HENT:      Head: Normocephalic and atraumatic.   Eyes:      Extraocular Movements: Extraocular movements intact.   Cardiovascular:      Rate and Rhythm: Regular rhythm. Tachycardia present.      Heart sounds: Normal heart sounds.   Pulmonary:      Effort: Pulmonary effort is normal.      Breath sounds: Normal breath sounds.   Abdominal:      General: There is no distension.      Palpations: Abdomen is soft.      Tenderness: There is no abdominal tenderness.   Musculoskeletal:         General: Normal range of motion.      Cervical back: Normal range of motion.      Right lower leg: No edema.      Left lower leg: No edema.   Skin:     General: Skin is warm.      Capillary Refill: Capillary refill takes less than 2 seconds.   Neurological:      Mental Status: She is alert.      Comments: No focal  deficits       Differential diagnose include but is not limited to arrhythmia, ischemia or acute coronary syndrome, PE, thyroid dysfunction, hypercortisolism    ED Course     Labs Reviewed   COMP METABOLIC PANEL (14) - Abnormal; Notable for the following components:       Result Value    Glucose 103 (*)     All other components within normal limits   TSH W REFLEX TO FREE T4 - Normal   MAGNESIUM - Normal   TROPONIN I HIGH SENSITIVITY - Normal   D-DIMER - Normal   CBC WITH DIFFERENTIAL WITH PLATELET   RAINBOW DRAW LAVENDER   RAINBOW DRAW LIGHT GREEN   RAINBOW DRAW BLUE     EKG at 1336    Rate, intervals and axes as noted on EKG Report.  Rate: 140  Rhythm: Narrow complex regular tachycardia  Reading: No STEMI, no ectopy, incomplete right bundle branch block, concerning for SVT    EKG at 1345    Rate, intervals and axes as noted on EKG Report.  Rate: 138  Rhythm: Again narrow complex regular tachycardia  Reading: SVT, no STEMI, no dynamic ST changes compared to above EKG     EKG at 1348    Rate, intervals and axes as noted on EKG Report.  Rate: 104  Rhythm: Sinus Rhythm  Reading: No STEMI, sinus rhythm, first-degree AV block, no ectopy       XR CHEST AP PORTABLE  (CPT=71045)    Result Date: 11/16/2024  CONCLUSION: Normal examination.     Dictated by (CST): Lewis Campuzano MD on 11/16/2024 at 2:35 PM     Finalized by (CST): Lewis Campuzano MD on 11/16/2024 at 2:35 PM                MDM      I spent a total of 35 minutes of critical care time in obtaining history, performing a physical exam, bedside monitoring of interventions, collecting and interpreting tests and discussion with consultants but not including time spent performing procedures.        Medical Decision Making    On arrival to the ED, patient exhibited narrow complex regular tachycardia concerning for SVT.  Vagal maneuvers were tried without success.  She was then given 10 mg IV diltiazem with immediate improvement in her heart rate and converted to sinus  rhythm.  She remained in sinus rhythm for the rest of the ER stay.  Per my independent interpretation of chest x-ray, no mediastinal widening or pulmonary edema.  Per my independent interpretation of labs, CBC, CMP, TSH, D-dimer, troponin and magnesium are unremarkable.  Discussed with HASEEB Lentz who advised 120 mg diltiazem XR daily and close follow-up in clinic for further evaluation and consideration of monitor if symptoms persist.  Return precautions provided and discussed and she is comfortable with the plan.    Problems Addressed:  SVT (supraventricular tachycardia) (HCC): complicated acute illness or injury with systemic symptoms    Amount and/or Complexity of Data Reviewed  Labs: ordered. Decision-making details documented in ED Course.  Radiology: ordered and independent interpretation performed. Decision-making details documented in ED Course.  ECG/medicine tests: ordered and independent interpretation performed. Decision-making details documented in ED Course.  Discussion of management or test interpretation with external provider(s): As above    Risk  Decision regarding hospitalization.        Disposition and Plan     Clinical Impression:  1. SVT (supraventricular tachycardia) (HCC)         Disposition:  Discharge  11/16/2024  3:05 pm    Follow-up:  Simone Lentz DO  [address]  [place] [address]  [phone]    Follow up in 1 week(s)            Medications Prescribed:  Current Discharge Medication List        START taking these medications    Details   dilTIAZem  MG Oral Capsule SR 24 Hr Take 1 capsule (120 mg total) by mouth daily.  Qty: 30 capsule, Refills: 0                 Supplementary Documentation:

## 2024-11-18 ENCOUNTER — TELEPHONE (OUTPATIENT)
Dept: ENDOCRINOLOGY CLINIC | Facility: CLINIC | Age: 29
End: 2024-11-18

## 2024-11-18 DIAGNOSIS — I47.10 SVT (SUPRAVENTRICULAR TACHYCARDIA) (HCC): Primary | ICD-10-CM

## 2024-11-18 NOTE — TELEPHONE ENCOUNTER
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 Patient has no prior history of abnormal HR/heart rhythm prior to this.     Spoke with patient. States her HR today has been between 80-90s. Is going to  prescription for Diltiazem oral tablets today as pharmacy did not have them in stock prior to today.     Patient is following up with Cardiology within the next week.     ER advised patient to also follow up with Endocrinology as well in case this is hormone related.     Per LOV 10/23:  \"We see that both total cortisol as well as free cortisol are elevated. 24-hour urinary cortisol level is normal. I would like to recheck these while patient has been off of OCPs for 3 months. \"    Please advise if additional testing is appropriate at this time.      with patient

## 2024-11-18 NOTE — TELEPHONE ENCOUNTER
Patient is requesting to speak to Dr. MEREDITH or Rn about current health update from over the weekend please call after 1:30pm today if possible

## 2024-11-18 NOTE — TELEPHONE ENCOUNTER
Patient states she was at the ER because of A-fib. Patient states she had IV meds. Patient is not sure if this is from the cushing syndrome. Heart rate was 190 and sustained for 4 hours. Please call

## 2024-11-18 NOTE — TELEPHONE ENCOUNTER
Called and notified patient. Verbalized understanding. States that she had TSH with reflex to T4 drawn in hospital.    Per message from Dr Hyde, patient is to still have TSH with Free T4 drawn.    sent notifying patient.

## 2024-11-19 ENCOUNTER — OFFICE VISIT (OUTPATIENT)
Dept: PHYSICAL MEDICINE AND REHAB | Facility: CLINIC | Age: 29
End: 2024-11-19
Payer: OTHER MISCELLANEOUS

## 2024-11-19 DIAGNOSIS — M54.16 RIGHT LUMBAR RADICULITIS: Primary | ICD-10-CM

## 2024-11-19 PROCEDURE — 99204 OFFICE O/P NEW MOD 45 MIN: CPT | Performed by: PHYSICAL MEDICINE & REHABILITATION

## 2024-11-19 RX ORDER — DILTIAZEM HYDROCHLORIDE 120 MG/1
CAPSULE, COATED, EXTENDED RELEASE ORAL
COMMUNITY
Start: 2024-11-16

## 2024-11-19 NOTE — PROGRESS NOTES
Progress note    C/C:   Chief Complaint   Patient presents with    New Patient     New R handed pt presenting with low back pain since 10/18/24 when pt injured her back rolling a patient onto their side. Pain 4/10. Pt has been close to 100% improved multiple times but continually re injures back. Pain no longer radiates down R leg and across back. Denies N/T. Denies weakness. No pain meds. PT has helped significantly. No imaging.      HPI: 29 year old presents with low back pain. Began due to a work related incident. Was in the middle of an OB emergency; patient had an epidural and needed to flipped from back to knees. Was holding patient up with one arm while reaching over, went to hit the emergency button with the other arm and began having acute low back pain; back felt as though it \"gave out.\" Sharp shooting pain into the right leg. She made it through the shift, and had significantly worsened upon getting to the car at the end of the shift. By the following morning severe low back pain limiting walking. Has been participating in physical therapy by [facility], has 4 sessions scheduled.     Since starting treatment no longer has shooting pain into the right leg, though does have cramping pain in the lower back only. Radiates in the back and midline tailbone only. Was to the point where there was minimal low back pain. Unfortunately she then had an episode of SVT and on attempted vagal maneuvers was positioned in knees drawn up to chest, reaggravated low back pain.     Had a history of low back pain due to scoliosis.    She is currently technically on light duty, but has not worked since Oct 18th due to no work available for her on light duty. She is wanting to return to work but is hesitant that she would be able to work 12 hour shifts on full duty without developing worsening low back pain.     She is currently undergoing workup with endocrinology for increased cortisol levels and has an appointment with  cardiology given the episode of SVT that brought her to the ER.     Pertinent allergies: Allergies[1]     Physical exam:  LMP 10/24/2024 (Approximate)      Lumbar spine exam:    No skin rash lumbar/upper sacral region  + pain with lumbar flexion. Noo pain with lumbar extension.  No tenderness to palpation bilateral lumbar paraspinals. No ttp bilateral PSIS.  5/5 LE strength b/l in HF, KE, ADF, EHL, ankle eversion  SILT b/l LE  2/4 quad, gastrocs reflexes b/l  Facet loading negative  Seated slump test negative  SLR negative b/l    Imaging: No imaging to review    Assessment and plan  Right lumbar radiculitis; back pain mostly centralized  SVT, on diltiazem    Recommend she continue with her remaining four sessions of physical therapy for neutral to extension lumbar stabilization. Ideally on the last session or two her physical therapist would work with her to assess her lifting mechanics if she progresses well. No NSAIDs or oral steroids given the SVT and workup for increased cortisol levels. Follow up in 2-3 weeks. Continue light duty in the meantime; no lifting, pushing, pulling greater than 15 pounds.    Galo Wilks DO  Physical Medicine and Rehabilitation  [facility]       [1]   Allergies  Allergen Reactions    Latex HIVES, ITCHING, RASH and SWELLING

## 2024-11-20 ENCOUNTER — MED REC SCAN ONLY (OUTPATIENT)
Dept: PHYSICAL MEDICINE AND REHAB | Facility: CLINIC | Age: 29
End: 2024-11-20

## 2024-11-21 ENCOUNTER — TELEPHONE (OUTPATIENT)
Dept: PHYSICAL MEDICINE AND REHAB | Facility: CLINIC | Age: 29
End: 2024-11-21

## 2024-11-21 NOTE — TELEPHONE ENCOUNTER
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 Secure chat sent to SRIDEVI Miner to call patient and add on for 1130am.

## 2024-11-21 NOTE — TELEPHONE ENCOUNTER
Patient calling to schedule for  offer he next available apt in [place] 12/17/24 and she declined the apt, she states she was instructed by  to request to be added on the his schedule on 12/2/24 that day he works on [employer]  testing all day. Please call to advice.

## 2024-12-02 ENCOUNTER — OFFICE VISIT (OUTPATIENT)
Dept: PHYSICAL MEDICINE AND REHAB | Facility: CLINIC | Age: 29
End: 2024-12-02
Payer: OTHER MISCELLANEOUS

## 2024-12-02 ENCOUNTER — LAB ENCOUNTER (OUTPATIENT)
Dept: LAB | Facility: HOSPITAL | Age: 29
End: 2024-12-02
Attending: INTERNAL MEDICINE
Payer: COMMERCIAL

## 2024-12-02 VITALS — HEIGHT: 66.5 IN | WEIGHT: 170 LBS | BODY MASS INDEX: 27 KG/M2

## 2024-12-02 DIAGNOSIS — I47.10 SVT (SUPRAVENTRICULAR TACHYCARDIA) (HCC): ICD-10-CM

## 2024-12-02 DIAGNOSIS — M54.16 RIGHT LUMBAR RADICULITIS: Primary | ICD-10-CM

## 2024-12-02 LAB
T3FREE SERPL-MCNC: 3.48 PG/ML (ref 2.4–4.2)
T4 FREE SERPL-MCNC: 1.1 NG/DL (ref 0.8–1.7)
TSI SER-ACNC: 0.83 UIU/ML (ref 0.55–4.78)

## 2024-12-02 PROCEDURE — 84443 ASSAY THYROID STIM HORMONE: CPT

## 2024-12-02 PROCEDURE — 84439 ASSAY OF FREE THYROXINE: CPT

## 2024-12-02 PROCEDURE — 36415 COLL VENOUS BLD VENIPUNCTURE: CPT

## 2024-12-02 PROCEDURE — 83835 ASSAY OF METANEPHRINES: CPT

## 2024-12-02 PROCEDURE — 84481 FREE ASSAY (FT-3): CPT

## 2024-12-02 NOTE — PROGRESS NOTES
Progress note    C/C:   Chief Complaint   Patient presents with    Follow - Up     LOV: 11/19/2024 pt comes in no symptoms. Scheduled to complete PT today. Denies medication.      HPI: 29 year old female presents for follow up.  Doing quite well.  No back or leg pains with regular household activities.  Able to bend forward and  objects off the floor without aggravating back or leg pains.  No numbness or tingling in the legs or feet.  Going to complete last scheduled session of physical therapy today.    Pertinent allergies: Allergies[1]     Physical exam:  Ht 66.5\"   Wt 170 lb (77.1 kg)   LMP 10/24/2024 (Approximate)   BMI 27.03 kg/m²      Lumbar spine exam:    No skin rash lumbar/upper sacral region  No pain with lumbar flexion. No pain with lumbar extension.  5/5 LE strength b/l  2/4 quad, gastrocs reflexes b/l  Seated slump test negative b/l    Imaging: No new imaging to review    Assessment and plan  Right lumbar radiculitis, currently minimal to no symptoms    Recommend she return to work on full duty, may work up to 6-hour shifts.  We will have her on this reduced schedule for 2 weeks and if doing well may return to full duty without restrictions.  If she has increased lower back or leg pain with liberalized work restrictions she should be seen for follow-up.    Galo Wilks DO  Physical Medicine and Rehabilitation  [facility]       [1]   Allergies  Allergen Reactions    Latex HIVES, ITCHING, RASH and SWELLING

## 2024-12-03 ENCOUNTER — MED REC SCAN ONLY (OUTPATIENT)
Dept: PHYSICAL MEDICINE AND REHAB | Facility: CLINIC | Age: 29
End: 2024-12-03

## 2024-12-04 LAB
METANEPHRINE: <25 PG/ML
NORMETANEPHRINE: 51.9 PG/ML

## 2024-12-09 ENCOUNTER — TELEPHONE (OUTPATIENT)
Dept: PHYSICAL MEDICINE AND REHAB | Facility: CLINIC | Age: 29
End: 2024-12-09

## 2024-12-09 NOTE — TELEPHONE ENCOUNTER
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 Please call to see if this apt can schedule sooner.

## 2024-12-19 ENCOUNTER — OFFICE VISIT (OUTPATIENT)
Dept: PHYSICAL MEDICINE AND REHAB | Facility: CLINIC | Age: 29
End: 2024-12-19
Payer: OTHER MISCELLANEOUS

## 2024-12-19 ENCOUNTER — HOSPITAL ENCOUNTER (OUTPATIENT)
Dept: GENERAL RADIOLOGY | Facility: HOSPITAL | Age: 29
Discharge: HOME | End: 2024-12-19
Attending: PHYSICAL MEDICINE & REHABILITATION
Payer: OTHER MISCELLANEOUS

## 2024-12-19 VITALS — WEIGHT: 170 LBS | HEIGHT: 66.5 IN | BODY MASS INDEX: 27 KG/M2

## 2024-12-19 DIAGNOSIS — M54.16 RIGHT LUMBAR RADICULITIS: Primary | ICD-10-CM

## 2024-12-19 DIAGNOSIS — M54.16 RIGHT LUMBAR RADICULITIS: ICD-10-CM

## 2024-12-19 PROCEDURE — 72100 X-RAY EXAM L-S SPINE 2/3 VWS: CPT | Performed by: PHYSICAL MEDICINE & REHABILITATION

## 2024-12-19 RX ORDER — MELOXICAM 15 MG/1
15 TABLET ORAL DAILY
Qty: 30 TABLET | Refills: 0 | Status: SHIPPED | OUTPATIENT
Start: 2024-12-19

## 2024-12-19 NOTE — PROGRESS NOTES
Progress note    C/C:   Chief Complaint   Patient presents with    Follow - Up     LOV 12/2/24. F/U for low back, leg pain. Pt re aggravated back at work on 12/9/24. Pain 1-2/10 because pt hasn't been working this week. Admits N/T in R leg immediately after injury. Denies weakness. Pt has noticed her spine is not straight. Pt takes flexeril and tylenol prn with some improvement.      HPI: 29-year-old female presents for follow-up.  On last visit I returned her to work on full duty with work hours restrictions. Worked Friday, Saturday, Sunday and awoke on Monday with significant back pain, numbness and tingling in the right leg. Contacted the office and did not hear back. Has not worked since last Sunday. Has had improvement with the relative rest. There was numbness in the right leg on Monday and Tuesday which has gone away. Still having significant tightness.    Pertinent allergies: Allergies[1]     Physical exam:  Ht 66.5\"   Wt 170 lb (77.1 kg)   LMP 10/24/2024 (Approximate)   BMI 27.03 kg/m²      Lumbar spine exam:    No skin rash lumbar/upper sacral region  + pain with lumbar flexion. + pain with lumbar extension.   + tenderness to palpation right lumbar paraspinals. No ttp right PSIS.  5/5 LE strength b/l in HF, KE, ADF, EHL, ankle eversion  SILT b/l LE  2/4 quad, gastrocs reflexes b/l  Seated slump test results in lower back pain  SLR negative b/l    Imaging: No new imaging to review    Assessment and plan  Right lumbar radiculitis, recurrent    At this point I recommend imaging- both XR and MRI of the lumbar spine w/o contrast. May continue to work full duty at 6 hour shifts. If she has another flare up of lower back pain she is to start meloxicam 15 mg daily for 7 days, then daily on an as-needed basis afterwards.  She has a follow-up scheduled with cardiology and will check with the cardiologist make sure that there are no issues taking this medication from a cardiac standpoint.    F/u after imaging is  done.     Galo Wilks DO  Physical Medicine and Rehabilitation  [facility]         [1]   Allergies  Allergen Reactions    Latex HIVES, ITCHING, RASH and SWELLING

## 2024-12-19 NOTE — PATIENT INSTRUCTIONS
If there is a long wait time to get the MRI you might be able to get it done sooner at [facility].

## 2024-12-22 ENCOUNTER — HOSPITAL ENCOUNTER (OUTPATIENT)
Dept: MRI IMAGING | Facility: HOSPITAL | Age: 29
End: 2024-12-22
Attending: PHYSICAL MEDICINE & REHABILITATION
Payer: OTHER MISCELLANEOUS

## 2024-12-22 ENCOUNTER — HOSPITAL ENCOUNTER (OUTPATIENT)
Dept: MRI IMAGING | Facility: HOSPITAL | Age: 29
Discharge: HOME | End: 2024-12-22
Attending: PHYSICAL MEDICINE & REHABILITATION
Payer: OTHER MISCELLANEOUS

## 2024-12-22 DIAGNOSIS — M54.16 RIGHT LUMBAR RADICULITIS: ICD-10-CM

## 2024-12-22 PROCEDURE — 72148 MRI LUMBAR SPINE W/O DYE: CPT | Performed by: PHYSICAL MEDICINE & REHABILITATION

## 2024-12-26 ENCOUNTER — OFFICE VISIT (OUTPATIENT)
Dept: PHYSICAL MEDICINE AND REHAB | Facility: CLINIC | Age: 29
End: 2024-12-26
Payer: OTHER MISCELLANEOUS

## 2024-12-26 ENCOUNTER — TELEPHONE (OUTPATIENT)
Dept: PHYSICAL MEDICINE AND REHAB | Facility: CLINIC | Age: 29
End: 2024-12-26

## 2024-12-26 VITALS — HEIGHT: 66.5 IN | WEIGHT: 170 LBS | BODY MASS INDEX: 27 KG/M2

## 2024-12-26 DIAGNOSIS — M54.16 RIGHT LUMBAR RADICULITIS: Primary | ICD-10-CM

## 2024-12-26 PROCEDURE — 99214 OFFICE O/P EST MOD 30 MIN: CPT | Performed by: PHYSICAL MEDICINE & REHABILITATION

## 2024-12-26 NOTE — PROGRESS NOTES
Progress note    C/C:   Chief Complaint   Patient presents with    Follow - Up     LOV 12/19/24 Pt is here for a follow up after MRI. MRI of spine lumbar was completed 12/22/24. Reports n/t starting at the right buttock and radiating down the right thigh and has been getting progressively worse. Not taking any pain meds or muscle relaxer's. Pain 3/10      HPI: 29 year old female presents for follow up. While not working has minimal to no pain. Has a stretch of 3 days of work, hesitant that it might flare up.  Has been having numbness and tingling in the right buttock and lateral thigh.  Has had x-ray and MRI of the lumbar spine, returns to discuss results.    Pertinent allergies: Allergies[1]     Physical exam:  Ht 66.5\"   Wt 170 lb (77.1 kg)   LMP 10/24/2024 (Approximate)   BMI 27.03 kg/m²      Lumbar spine exam:    No skin rash lumbar/upper sacral region  + pain with lumbar flexion. No pain with lumbar extension.  5/5 LE strength b/l  SILT b/l LE  2/4 quad, gastrocs reflexes b/l    Imaging: MRI lumbar spine dated 12/22/24 independently reviewed, as was report. L4-5 diffuse disc bulge without clear nerve root abutment/impingement.     XR lumbar spine shows a minimal scoliotic curve and a significant amount of bowel gas, but is otherwise normal.     Assessment and plan  1) subactute to chronic low back pain    We discussed treatment approach which is essentially desensitization of the disc, though either work modification or work conditioning along with a 14 day course of meloxicam 15mg qDaily.  Since last time I saw her she followed up with cardiology but was not sure if she should start the medication; she will start it today, continue working no more than 6 hours at a time without lifting restrictions and I will see her for follow-up in 3 weeks.  There may be a role for either work conditioning, a right L4 transforaminal epidural steroid injection or both if still symptomatic with meloxicam + work  modification.     Galo Wilks DO  Physical Medicine and Rehabilitation  [facility]       [1]   Allergies  Allergen Reactions    Latex HIVES, ITCHING, RASH and SWELLING

## 2024-12-26 NOTE — TELEPHONE ENCOUNTER
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 Her boss states the letter is not very clear. Pt states her manager is taking the letter as she is still able to work Full time (6 hr shift, 5 days a week) but pt says Dr. Wilks wants pt to work Part Time with modified hours. Pt is looking to have note written more clearly. Please advise, thank you.

## 2024-12-28 ENCOUNTER — PATIENT MESSAGE (OUTPATIENT)
Dept: PHYSICAL MEDICINE AND REHAB | Facility: CLINIC | Age: 29
End: 2024-12-28

## 2024-12-30 ENCOUNTER — TELEPHONE (OUTPATIENT)
Dept: ENDOCRINOLOGY CLINIC | Facility: CLINIC | Age: 29
End: 2024-12-30

## 2024-12-30 DIAGNOSIS — R79.89 ELEVATED CORTISOL LEVEL: Primary | ICD-10-CM

## 2024-12-30 NOTE — TELEPHONE ENCOUNTER
Dr. Hyde --     Pt is requesting lab orders since next week, she will have been off OCP's for 3 months.     Also when would you like patient to follow up with you in clinic? Last office visit doesn't state and want to confirm with you

## 2024-12-31 NOTE — TELEPHONE ENCOUNTER
LVMTCB.     Will need clarification, since per Dr. Wilks's LOV note,\"continue working no more than 6 hours at a time without lifting restrictions and I will see her for follow-up in 3 weeks.\"

## 2024-12-31 NOTE — TELEPHONE ENCOUNTER
S/w patient who stated that Dr. Wilks did not want her working more than 6 hours at a time, 3 days a week and they can not be consecutive days. Work note she was given on 12/26/24 did not specify the amount of days or that she is unable to work consecutive days.     Informed patient that Dr. Wilks is out of office until 01/07/25 but a message will be sent to him.

## 2025-01-15 NOTE — TELEPHONE ENCOUNTER
Patient is calling to follow up on getting lab orders entered for hormone testing and cortisol testing. Patient states that she has left several messages and still no call back.

## 2025-01-16 ENCOUNTER — LAB ENCOUNTER (OUTPATIENT)
Dept: LAB | Facility: HOSPITAL | Age: 30
End: 2025-01-16
Attending: INTERNAL MEDICINE
Payer: COMMERCIAL

## 2025-01-16 ENCOUNTER — OFFICE VISIT (OUTPATIENT)
Dept: PHYSICAL MEDICINE AND REHAB | Facility: CLINIC | Age: 30
End: 2025-01-16
Payer: OTHER MISCELLANEOUS

## 2025-01-16 VITALS — WEIGHT: 170 LBS | HEIGHT: 66.5 IN | BODY MASS INDEX: 27 KG/M2

## 2025-01-16 DIAGNOSIS — R79.89 ELEVATED CORTISOL LEVEL: ICD-10-CM

## 2025-01-16 DIAGNOSIS — M54.16 RIGHT LUMBAR RADICULITIS: Primary | ICD-10-CM

## 2025-01-16 LAB — CORTIS SERPL-MCNC: 11.4 UG/DL

## 2025-01-16 PROCEDURE — 99213 OFFICE O/P EST LOW 20 MIN: CPT | Performed by: PHYSICAL MEDICINE & REHABILITATION

## 2025-01-16 PROCEDURE — 82530 CORTISOL FREE: CPT

## 2025-01-16 PROCEDURE — 82533 TOTAL CORTISOL: CPT

## 2025-01-16 PROCEDURE — 36415 COLL VENOUS BLD VENIPUNCTURE: CPT

## 2025-01-16 NOTE — TELEPHONE ENCOUNTER
Spoke to patient to advise lab orders placed - patient stated understanding and will complete between 7-8am

## 2025-01-16 NOTE — PROGRESS NOTES
Progress note    C/C:   Chief Complaint   Patient presents with    Follow - Up     LOV 12/26/24. F/U for low back pain, R leg pain. Pt finished finished 2 weeks of meloxicam, wonders if she should finish the rest of the pills. Pt is still on work restriction. Pain 0/10. Denies N/T, weakness. No pain meds besides meloxicam. Pt finished PT in 12/2024.      HPI: 29-year-old female presents for follow-up.  Minimal to no back or leg pain.  She has been able to work full duty with work hours restrictions, which have not caused any significant back pain radiating down either leg.  Has worked at least 2 shifts back-to-back without provoked pain.  She is able to sit for prolonged period of time without provoking pain.  Took a course of meloxicam as prescribed, has some leftover, has not had the need to take the medicine.    Pertinent allergies: Allergies[1]     Physical exam:  Ht 66.5\"   Wt 170 lb (77.1 kg)   LMP 10/24/2024 (Approximate)   BMI 27.03 kg/m²      Lumbar spine exam:    No skin rash lumbar/upper sacral region  No pain with lumbar flexion. No pain with lumbar extension.  5/5 LE strength b/l  2/4 quad, gastrocs reflexes b/l  Seated slump test negative    Imaging: No new imaging to review    Assessment and plan  Lumbar radiculitis, resolved    Symptoms have completely resolved.  At this point I recommend that she return to work full duty without restrictions.  She may see me in 1 month's time if she has increased back or leg pain being completely off work hour restrictions; if doing well no need to be seen for follow-up.    Galo Wilks DO  Physical Medicine and Rehabilitation  [facility]       [1]   Allergies  Allergen Reactions    Latex HIVES, ITCHING, RASH and SWELLING

## 2025-01-17 ENCOUNTER — APPOINTMENT (OUTPATIENT)
Dept: LAB | Facility: HOSPITAL | Age: 30
End: 2025-01-17
Attending: INTERNAL MEDICINE
Payer: COMMERCIAL

## 2025-01-17 PROCEDURE — 82530 CORTISOL FREE: CPT

## 2025-01-18 ENCOUNTER — LAB ENCOUNTER (OUTPATIENT)
Dept: LAB | Facility: HOSPITAL | Age: 30
End: 2025-01-18
Attending: STUDENT IN AN ORGANIZED HEALTH CARE EDUCATION/TRAINING PROGRAM
Payer: COMMERCIAL

## 2025-01-18 DIAGNOSIS — R79.89 ELEVATED CORTISOL LEVEL: ICD-10-CM

## 2025-01-18 LAB
CREAT UR-SCNC: 1.38 G/24 HR (ref 0.6–1.8)
SPECIMEN VOL UR: 900 ML

## 2025-01-18 PROCEDURE — 82024 ASSAY OF ACTH: CPT

## 2025-01-18 PROCEDURE — 36415 COLL VENOUS BLD VENIPUNCTURE: CPT

## 2025-01-19 LAB — ACTH: 27 PG/ML

## 2025-01-21 LAB
CORTISOL FREE 24H UR: 16 UG/24 HR
CORTISOL FREE UR: 18 UG/L
CREAT 24H UR: 1439 MG/24 HR
CREAT UR: 159.9 MG/DL
Lab: 0.44 UG/DL

## 2025-01-22 ENCOUNTER — OFFICE VISIT (OUTPATIENT)
Dept: FAMILY MEDICINE CLINIC | Facility: CLINIC | Age: 30
End: 2025-01-22

## 2025-01-22 VITALS
WEIGHT: 178 LBS | HEART RATE: 86 BPM | SYSTOLIC BLOOD PRESSURE: 101 MMHG | DIASTOLIC BLOOD PRESSURE: 64 MMHG | BODY MASS INDEX: 28.27 KG/M2 | HEIGHT: 66.5 IN

## 2025-01-22 DIAGNOSIS — Z31.41 FERTILITY TESTING: Primary | ICD-10-CM

## 2025-01-22 DIAGNOSIS — K59.00 CONSTIPATION, UNSPECIFIED CONSTIPATION TYPE: ICD-10-CM

## 2025-01-22 PROCEDURE — 99213 OFFICE O/P EST LOW 20 MIN: CPT | Performed by: STUDENT IN AN ORGANIZED HEALTH CARE EDUCATION/TRAINING PROGRAM

## 2025-01-22 RX ORDER — SENNA AND DOCUSATE SODIUM 50; 8.6 MG/1; MG/1
1 TABLET, FILM COATED ORAL DAILY
Qty: 30 TABLET | Refills: 0 | Status: SHIPPED | OUTPATIENT
Start: 2025-01-22

## 2025-01-22 RX ORDER — BISACODYL 5 MG/1
5 TABLET, DELAYED RELEASE ORAL
Qty: 30 TABLET | Refills: 0 | Status: SHIPPED | OUTPATIENT
Start: 2025-01-22

## 2025-01-22 NOTE — PROGRESS NOTES
HPI:    Patient ID: Paz Paz is a 29 year old female.    HPI  Pt presenting for follow-up.    Ongoing difficulty losing weight  Seen by Endo, labs unrevealing  Discontinued OCPs  Reports stable periods: light bleeding lasting 5 days    H/o constipation  Prior Linzess without relief  Probiotics with fiber    Concerned about future fertility    Review of Systems   A comprehensive 10 point review of systems was completed.  Pertinent positives and negatives noted in the the HPI.       Current Outpatient Medications   Medication Sig Dispense Refill    bisacodyl 5 MG Oral Tab EC Take 1 tablet (5 mg total) by mouth daily as needed for constipation. 30 tablet 0    senna-docusate 8.6-50 MG Oral Tab Take 1 tablet by mouth daily. 30 tablet 0    Cholecalciferol 50 MCG (2000 UT) Oral Tab Vitamin D3 50 mcg (2,000 unit) tablet, [RxNorm: 166557]      cyclobenzaprine 5 MG Oral Tab TAKE 1-2 TABLETS 2 HOURS BEFORE BEDTIME AS NEEDED      spironolactone 100 MG Oral Tab Take 1 tablet (100 mg total) by mouth daily. Take 1 tablet daily for 1 week, then 2 tablets daily for 1 week then 3 tablets daily. 90 tablet 3    MAGNESIUM CITRATE OR Take by mouth at bedtime.       Allergies:Allergies[1]   Vitals:    01/22/25 0904   BP: 101/64   Pulse: 86   Weight: 178 lb (80.7 kg)   Height: 5' 6.5\" (1.689 m)       Body mass index is 28.3 kg/m².   PHYSICAL EXAM:   Physical Exam  Vitals reviewed.   Constitutional:       General: She is not in acute distress.  HENT:      Head: Normocephalic.   Eyes:      Conjunctiva/sclera: Conjunctivae normal.   Cardiovascular:      Rate and Rhythm: Normal rate.      Pulses: Normal pulses.   Pulmonary:      Effort: Pulmonary effort is normal.      Breath sounds: Normal breath sounds.   Musculoskeletal:      Cervical back: Normal range of motion.   Lymphadenopathy:      Cervical: No cervical adenopathy.   Neurological:      Mental Status: She is alert and oriented to person, place, and time. Mental status is at  baseline.   Psychiatric:         Mood and Affect: Mood normal.         Behavior: Behavior normal.             ASSESSMENT/PLAN:   1. Fertility testing  Recent labs reviewed  Will recheck levels  Consider Endo follow-up vs [facility] eval  - Anti-Mullerian Hormone; Future  - Reproductive Endocrinology Referral - In Network  - Estradiol [E]; Future  - Progesterone [E]; Future  - Testosterone,Total and Weakly Bound w/ SHBG; Future  - Endocrine Referral - In Network    2. Constipation, unspecified constipation type  - increased hydration as tolerated, continued exercise as able  - to increase dietary fiber sources  - Miralax fiber supplementation  - will continue Bisacodyl -- add senna dosing  - consider GI referral  - Gastro Referral - [facility])  - bisacodyl 5 MG Oral Tab EC; Take 1 tablet (5 mg total) by mouth daily as needed for constipation.  Dispense: 30 tablet; Refill: 0  - senna-docusate 8.6-50 MG Oral Tab; Take 1 tablet by mouth daily.  Dispense: 30 tablet; Refill: 0    Pt verbalized understanding and agrees with plan.    Orders Placed This Encounter   Procedures    Anti-Mullerian Hormone    Estradiol [E]    Progesterone [E]    Testosterone,Total and Weakly Bound w/ SHBG       Meds This Visit:  Requested Prescriptions     Signed Prescriptions Disp Refills    bisacodyl 5 MG Oral Tab EC 30 tablet 0     Sig: Take 1 tablet (5 mg total) by mouth daily as needed for constipation.    senna-docusate 8.6-50 MG Oral Tab 30 tablet 0     Sig: Take 1 tablet by mouth daily.       Imaging & Referrals:  REPRODUCTIVE ENDOCRINOLOGY - INTERNAL  GASTRO - INTERNAL  ENDOCRINOLOGY - INTERNAL         ID#[id number]         [1]   Allergies  Allergen Reactions    Latex HIVES, ITCHING, RASH and SWELLING

## 2025-01-29 ENCOUNTER — LAB ENCOUNTER (OUTPATIENT)
Dept: LAB | Facility: HOSPITAL | Age: 30
End: 2025-01-29
Attending: STUDENT IN AN ORGANIZED HEALTH CARE EDUCATION/TRAINING PROGRAM
Payer: COMMERCIAL

## 2025-01-29 ENCOUNTER — TELEPHONE (OUTPATIENT)
Dept: FAMILY MEDICINE CLINIC | Facility: CLINIC | Age: 30
End: 2025-01-29

## 2025-01-29 DIAGNOSIS — Z31.41 FERTILITY TESTING: ICD-10-CM

## 2025-01-29 LAB
ESTRADIOL SERPL-MCNC: 94.9 PG/ML
PROGEST SERPL-MCNC: 0.38 NG/ML

## 2025-01-29 PROCEDURE — 84410 TESTOSTERONE BIOAVAILABLE: CPT

## 2025-01-29 PROCEDURE — 82670 ASSAY OF TOTAL ESTRADIOL: CPT

## 2025-01-29 PROCEDURE — 83520 IMMUNOASSAY QUANT NOS NONAB: CPT

## 2025-01-29 PROCEDURE — 84144 ASSAY OF PROGESTERONE: CPT

## 2025-01-29 PROCEDURE — 36415 COLL VENOUS BLD VENIPUNCTURE: CPT

## 2025-01-29 NOTE — TELEPHONE ENCOUNTER
Patient calling to verify lab orders are in the system for her to get blood drawn today.  Patient states they were supposed to be added on to existing sample, however, too many days have passed.  Patient informed orders are in the system and that she will need to get blood drawn. Non fasting.  Verbalized understanding.

## 2025-02-03 LAB — MULLERIAN AMH: 1.44 NG/ML

## 2025-02-04 LAB
SEX HORM BIND GLOB: 22.7 NMOL/L
TESTOST % FREE+WEAK BND: 38.6 %
TESTOST FREE+WEAK BND: 4.9 NG/DL
TESTOSTERONE TOT /MS: 12.7 NG/DL

## 2025-02-24 ENCOUNTER — PATIENT MESSAGE (OUTPATIENT)
Dept: FAMILY MEDICINE CLINIC | Facility: CLINIC | Age: 30
End: 2025-02-24

## 2025-02-28 ENCOUNTER — OFFICE VISIT (OUTPATIENT)
Dept: SURGERY | Facility: CLINIC | Age: 30
End: 2025-02-28
Payer: COMMERCIAL

## 2025-02-28 VITALS
BODY MASS INDEX: 27.47 KG/M2 | SYSTOLIC BLOOD PRESSURE: 100 MMHG | WEIGHT: 173 LBS | HEIGHT: 66.5 IN | DIASTOLIC BLOOD PRESSURE: 68 MMHG | HEART RATE: 92 BPM

## 2025-02-28 DIAGNOSIS — E66.3 OVERWEIGHT WITH BODY MASS INDEX (BMI) OF 27 TO 27.9 IN ADULT: ICD-10-CM

## 2025-02-28 DIAGNOSIS — E55.9 VITAMIN D DEFICIENCY: ICD-10-CM

## 2025-02-28 DIAGNOSIS — E78.2 MIXED HYPERLIPIDEMIA: Primary | ICD-10-CM

## 2025-02-28 PROCEDURE — 99204 OFFICE O/P NEW MOD 45 MIN: CPT | Performed by: INTERNAL MEDICINE

## 2025-02-28 RX ORDER — DIETHYLPROPION HYDROCHLORIDE 25 MG/1
1 TABLET ORAL
Qty: 60 TABLET | Refills: 2 | Status: SHIPPED | OUTPATIENT
Start: 2025-02-28 | End: 2025-03-30

## 2025-02-28 NOTE — PATIENT INSTRUCTIONS
Please try to work on the following dietary changes:  Goals: Aim for 20-30 grams of protein/ meal  Aim for <100 grams of carbohydrates/day  Eat 4-6 vegetables/day  Avoid skipping meals- eat every 4-5 hours  Aim for 3 meals/day  2. Drink lots of water and cut down on soda/juice consumption if soda/juice drinker  3. Focus on protein: (15-30 grams with each meal) ie. greek yogurt, cottage cheese, string cheese, hard boiled eggs  4. Healthy snacks: always have protein in your snack! peanut butter and apples, hummus and carrots, berries, nuts (1/4 cup), tuna and crackers                 Protein Shakes: Premier protein or Core Power                Protein Bars: Rx Bars, Oatmega, Power Crunch                 S[place]ento balanced breaks (cheese and nuts)- without chocolate  5. Reduce carbohydrates <100 grams which includes sweets as well as rice, pasta, potatoes, bread, corn and instead choose whole grain options or more protein or vegetables (4-6 servings of vegetables per day). Use [employer] mandie for carb counting!  6. Get a good night of sleep  7. Try to decrease stress in life; meditate at least 10 minutes before sleeping! Try it and let me know what works for you, try [employer] or apps like Calm and [employer] technologies!     Please download apps:  1. \"My Fitness Pal\" (other option is Lose it)) to help you to monitor daily dietary intake and you will be able to see if you are eating the right amount of calories, protein, carbs                With My Fitness Pal-->When you set-up the mandie or need to adjust settings:                Goals should include:                 Lose 1.5-2 lbs per week                Activity level: not very active (can't count exercise towards calorie number per day)                   ** Daily INPUT> Look at nutrition section-- \"nutrients\" and it will break down your macros for the day (ie. Protein, carbs, fibers, sugars and fats). Try to stay within these numbers daily     2. \"7 minute workout\" to help with  exercise/activity which takes 7 minutes of your day and that you can do at home!   3. \"Calm\" or \"Headspace\" which helps with mindfulness, meditation, clarity, sleep, and mauricio to your daily life.   4. [employer] blog for healthy recipe ideas  5. DietHammerKit for low carb resources     HIGH PROTEIN SNACK IDEAS  -cottage cheese  -plain yogurt  -kefir  -hard-boiled eggs  -natural cheeses  -nuts (measure portion size)   -unsweetened nut butters  -dried edamame   -apollo seeds soaked in water or almond milk  -soy nuts  -cured meats (monitor for sodium issues)   -hummus with vegetables  -bean dip with vegetables     FRUIT  Low carb fruit options   Raspberries: Half a cup (60 grams) contains 3 grams of carbs.  Blackberries: Half a cup (70 grams) contains 4 grams of carbs.  Strawberries: Half a cup (100 grams) contains 6 grams of carbs.  Blueberries: Half a cup (50 grams) contains 6 grams of carbs.  Plum: One medium-sized (80 grams) contains 6 grams of carbs.     VEGETABLES  Low carb vegetables             Understanding Carbohydrates  Goal <100g  A car needs the right type of fuel to run. And you need the right kind of food to function. To keep your energy level up, your body needs food that has carbohydrates (carbs). But carbs raise blood sugar levels higher and faster than other kinds of food. Your dietitian will work with you to figure out the amount of carbs youneed. Carbs come in 3 types: starches, sugars, and fiber.   Starches  Starches are found in grains, some vegetables, and beans. Grain products include bread, pasta, cereal, and tortillas. Starchy vegetables include potatoes, peas, corn, lima beans, yams, and squash. Kidney beans, white beans,black beans, garbanzo beans, and lentils also have starches.     Sugars  Sugars are found naturally in many foods. Or they can be added. Foods that contain natural sugar include fruits and fruit juices, dairy products, honey, and molasses. Added sugars are found in most  desserts, processed foods, candy, regular soda, and fruit drinks. These are very helpful to treat low blood sugar (hypoglycemia). They give you sugar quickly. Try to keep at least 15 to 20 grams of these simple sugars with you at all times. Eat or drink these if you start to havesymptoms of low blood sugar.   Fiber  Fiber comes from plant foods. Your body can't digest most fiber. Instead of raising blood sugar levels like other carbs, fiber stops blood sugar from rising too fast. Fiber is found in fruits, vegetables, whole grains, beans,peas, and many nuts.   Carb counting  Keep track of the amount of carbs you eat. This can help you keep the right balance of carbs, physical activity, and medicine. The amount of carbs you need will be different from what other people need. How much you need depends on many things. These include your health, the medicines you take, and how active you are. Your healthcare team will help you figure out the right amount of carbs for you. You may start with 45 to 60 grams of carbs per meal, depending on your case. Carb counting is a system that helps you keep track of thecarbohydrates you eat at each meal.   Carbs come from many foods. These include grains, starchy vegetables, fruit, milk, beans, and snack foods. You can either count carbohydrate grams or carbohydrate servings. When you count carbohydrate servings, 1 carbohydrateserving = 15 grams of carbohydrates.   Here are some examples of foods that have about 15 grams of carbs (1 serving of carbohydrates):   1/2 cup of canned or frozen fruit  A small piece of fresh fruit (4 ounces)  1 slice of bread  1/2 cup of oatmeal  1/3 cup of rice  4 to 6 crackers  1/2 English muffin  1/2 cup of black beans  1/4 of a large baked potato (3 ounces)  2/3 cup of plain fat-free yogurt  1 cup of soup  1/2 cup of casserole  6 chicken nuggets  2-inch-square brownie or cake without frosting  2 small cookies  1/2 cup of ice cream or sherbet  Carb  counting is easier when food labels are available. Look at the label to see how many grams of total carbs per serving the food contains. Then you can figure out how much you should eat. If your food doesn't have a nutrition label, you should be able to get an idea how many carbs there are per servingby using a book or website.   Two very important lines to look at on the label are the serving size and the total carbohydrate amount per serving. Here are some tips for using food labelsto count your carbs:   Check the serving size. The information on the label is based on that serving size. If you eat more than the listed serving size, you may have to double or triple the other information on the label.   Check the total grams of carbs.  Total carbohydrate from the label includes sugar, starch, and fiber. Be sure to use the total carbohydrate number (minus the fiber) and not sugar alone.  Know how many grams of carbs you can have.  Be familiar with the matching portion sizes.  Compare labels. Compare the labels of different products. Look at serving sizes and total carbs to find the products that work best for you.   Don't forget protein and fat. With the focus on carb counting, it might be easy to forget protein and fat in your meals. Don't forget to include sources of protein and healthy fat to balance your meals. Also watch how much salt (sodium) you eat. This is especially true if you have high blood pressure. If you have diabetes, limit the amount of sodium to less than 2,300 mg a day.    It’s also important to be consistent with the amount of carbs and time you eat when taking a fixed dose of diabetes medicine. Work with your healthcare provider or dietitian if you need more help. They can help you keep track of your carbs. They can also help you figure out how many grams of carbs youshould have.

## 2025-02-28 NOTE — PROGRESS NOTES
CC:   Chief Complaint   Patient presents with    Consult    Weight Management        Referring Physician to whom this note will be reported back to: Nancy Ash MD    Reason for medical consultation: Medical Management of Weight and Weight related comorbidities.      HPI:   - Tried phentermine 2023 x 5 months, got HTN and blurry vision  - Hx SVT 11/2024, seen an EP was cleared with extensive workup was on Cardizem     Body mass index is 27.5 kg/m².   Wt Readings from Last 6 Encounters:   02/28/25 173 lb (78.5 kg)   01/22/25 178 lb (80.7 kg)   01/16/25 170 lb (77.1 kg)   12/26/24 170 lb (77.1 kg)   12/19/24 170 lb (77.1 kg)   12/02/24 170 lb (77.1 kg)     /68   Pulse 92   Ht 5' 6.5\" (1.689 m)   Wt 173 lb (78.5 kg)   LMP 10/24/2024 (Approximate)   BMI 27.50 kg/m²   Vitals:    02/28/25 1106   BP: 100/68   Pulse: 92     Body mass index is 27.5 kg/m².  Waist Circumference: 37.5 inches         LABS and RESULTS:     [facility] Lab Encounter on 01/29/2025   Component Date Value    Mullerian AMH 01/29/2025 1.44     Estradiol 01/29/2025 94.9     Progesterone 01/29/2025 0.38     Testosterone Tot LC/MS 01/29/2025 12.7     Testost % Free+Weak Bnd 01/29/2025 38.6 (H)     Testost Free+Weak Bnd 01/29/2025 4.9     Sex Horm Bind Glob 01/29/2025 22.7 (L)    [facility] Lab Encounter on 01/18/2025   Component Date Value    Creat 24h Ur 01/17/2025 1439     Creatinine, Urine 01/17/2025 159.9     Cortisol Free Ur 01/17/2025 18     Cortisol Free 24h Ur 01/17/2025 16     Creat Ur Calc 01/17/2025 1.38     Urine Volume 24Hr 01/17/2025 900     ACTH 01/18/2025 27.0    [facility] Lab Encounter on 01/16/2025   Component Date Value    Cortisol 01/16/2025 11.4     Cortisol, Free Dialysis,* 01/16/2025 0.442    [facility] Lab Encounter on 12/02/2024   Component Date Value    Free T4 12/02/2024 1.1     TSH 12/02/2024 0.829     T3 Free 12/02/2024 3.48     Normetanephrine 12/02/2024 51.9     Metanephrine 12/02/2024 <25.0    Admission on 11/16/2024, Discharged on  11/16/2024   Component Date Value    Ventricular rate 11/16/2024 140     QRS Duration 11/16/2024 100     Q-T Interval 11/16/2024 290     QTC Calculation (Bezet) 11/16/2024 442     R Axis 11/16/2024 -20     T Axis 11/16/2024 43     Hold Lavender 11/16/2024 Auto Resulted     Hold Lt Green 11/16/2024 Auto Resulted     Hold Blue 11/16/2024 Auto Resulted     Ventricular rate 11/16/2024 138     QRS Duration 11/16/2024 100     Q-T Interval 11/16/2024 296     QTC Calculation (Bezet) 11/16/2024 448     R [place] 11/16/2024 -16     T [place] 11/16/2024 53     WBC 11/16/2024 9.4     RBC 11/16/2024 5.10     HGB 11/16/2024 15.2     HCT 11/16/2024 43.3     MCV 11/16/2024 84.9     MCH 11/16/2024 29.8     MCHC 11/16/2024 35.1     RDW-SD 11/16/2024 36.8     RDW 11/16/2024 11.9     PLT 11/16/2024 297.0     Neutrophil Absolute Prel* 11/16/2024 6.59     Neutrophil Absolute 11/16/2024 6.59     Lymphocyte Absolute 11/16/2024 1.95     Monocyte Absolute 11/16/2024 0.77     Eosinophil Absolute 11/16/2024 0.03     Basophil Absolute 11/16/2024 0.02     Immature Granulocyte Abs* 11/16/2024 0.04     Neutrophil % 11/16/2024 70.2     Lymphocyte % 11/16/2024 20.7     Monocyte % 11/16/2024 8.2     Eosinophil % 11/16/2024 0.3     Basophil % 11/16/2024 0.2     Immature Granulocyte % 11/16/2024 0.4     Glucose 11/16/2024 103 (H)     Sodium 11/16/2024 139     Potassium 11/16/2024 3.8     Chloride 11/16/2024 107     CO2 11/16/2024 21.0     Anion Gap 11/16/2024 11     BUN 11/16/2024 11     Creatinine 11/16/2024 0.86     BUN/CREA Ratio 11/16/2024 12.8     Calcium, Total 11/16/2024 9.9     Calculated Osmolality 11/16/2024 288     eGFR-Cr 11/16/2024 94     ALT 11/16/2024 12     AST 11/16/2024 15     Alkaline Phosphatase 11/16/2024 77     Bilirubin, Total 11/16/2024 0.4     Total Protein 11/16/2024 7.4     Albumin 11/16/2024 4.3     Globulin  11/16/2024 3.1     A/G Ratio 11/16/2024 1.4     TSH 11/16/2024 1.614     Magnesium 11/16/2024 1.8     Ventricular rate  11/16/2024 104     Atrial rate 11/16/2024 104     P-R Interval 11/16/2024 222     QRS Duration 11/16/2024 64     Q-T Interval 11/16/2024 316     QTC Calculation (Bezet) 11/16/2024 415     P Axis 11/16/2024 53     R [place] 11/16/2024 10     T Axis 11/16/2024 45     Troponin I (High Sensiti* 11/16/2024 5     D-Dimer 11/16/2024 <0.27    Office Visit on 10/24/2024   Component Date Value    Thin Prep Pap 10/24/2024 AP TEST REFLEXED     Interpretation/Result 10/24/2024 Negative for intraepithelial lesion or malignancy     Specimen Adequacy 10/24/2024 Satisfactory for evaluation. Endocervical or metaplastic cells present  Partially obscuring blood present  Scant cellularity present     General Categorization 10/24/2024 Negative for intraepithelial lesion or malignancy     Recommendations/Comments 10/24/2024                      Value:Two separate liquid preparation smears were examined.  Screened by the Thinprep Imaging System and a cytotechnologist.      Procedure 10/24/2024                      Value:Monolayers:  2, specimen collected in Thinprep vial, 20 ml Preservcyt      Clinical Information 10/24/2024                      Value:Z12.4 Screening For Cervical Cancer.         Reason for testing 10/24/2024 Screening     Gyn Additional Informati* 10/24/2024                      Value:NOTE:  The Pap smear is a screening test that aids in the detection of cervical cancer and its precursors.  False negative and false positive results can occur. Regular sampling is recommended to minimize false negative results.      Case Report 10/24/2024                      Value:Gynecologic Cytology                              Case: [id number]                                  Authorizing Provider:  Nancy Ash MD   Collected:           10/24/2024 05:11 PM          Ordering Location:     [facility]    Received:            10/25/2024 10:03 AM                                 [facility],                                                                              [place]                                                                     First Screen:          Jackie Richey                                                                     Specimen:    ThinPrep Imager Screening Pap, Cervical/endocervical                                      [facility] Lab Encounter on 10/04/2024   Component Date Value    Glucose 10/04/2024 92     Sodium 10/04/2024 138     Potassium 10/04/2024 4.1     Chloride 10/04/2024 107     CO2 10/04/2024 24.0     Anion Gap 10/04/2024 7     BUN 10/04/2024 18     Creatinine 10/04/2024 0.83     BUN/CREA Ratio 10/04/2024 21.7 (H)     Calcium, Total 10/04/2024 9.5     Calculated Osmolality 10/04/2024 288     eGFR-Cr 10/04/2024 98     ALT 10/04/2024 9 (L)     AST 10/04/2024 14     Alkaline Phosphatase 10/04/2024 71     Bilirubin, Total 10/04/2024 0.5     Total Protein 10/04/2024 7.6     Albumin 10/04/2024 4.6     Globulin  10/04/2024 3.0     A/G Ratio 10/04/2024 1.5     Patient Fasting for CMP? 10/04/2024 Yes     Estradiol 10/04/2024 26.3     17-OH Progesterone 10/04/2024 37     FSH 10/04/2024 4.6     LH 10/04/2024 5.8     Cholesterol, Total 10/04/2024 216 (H)     HDL Cholesterol 10/04/2024 58     Triglycerides 10/04/2024 155 (H)     LDL Cholesterol 10/04/2024 131 (H)     VLDL 10/04/2024 28     Non HDL Chol 10/04/2024 158 (H)     Patient Fasting for Lipi* 10/04/2024 Yes     HgbA1C 10/04/2024 5.3     Estimated Average Glucose 10/04/2024 105     ACTH 10/04/2024 42.3     Cortisol 10/04/2024 32.9     Prolactin 10/04/2024 13.7     WBC 10/04/2024 7.3     RBC 10/04/2024 4.55     HGB 10/04/2024 13.7     HCT 10/04/2024 39.7     MCV 10/04/2024 87.3     MCH 10/04/2024 30.1     MCHC 10/04/2024 34.5     RDW-SD 10/04/2024 37.2     RDW 10/04/2024 11.6     PLT 10/04/2024 294.0     Neutrophil Absolute Prel* 10/04/2024 4.71     Neutrophil Absolute 10/04/2024 4.71     Lymphocyte Absolute 10/04/2024 1.90     Monocyte Absolute 10/04/2024  0.54     Eosinophil Absolute 10/04/2024 0.05     Basophil Absolute 10/04/2024 0.02     Immature Granulocyte Abs* 10/04/2024 0.03     Neutrophil % 10/04/2024 65.0     Lymphocyte % 10/04/2024 26.2     Monocyte % 10/04/2024 7.4     Eosinophil % 10/04/2024 0.7     Basophil % 10/04/2024 0.3     Immature Granulocyte % 10/04/2024 0.4     Cortisol, Free Dialysis,* 10/04/2024 1.59     Microalbumin, Urine 10/04/2024 <0.30     Creatinine Ur Random 10/04/2024 82.20     Malb/Cre Calc 10/04/2024      Vitamin D, 25OH, Total 10/04/2024 27.6 (L)     Creat Ur Calc 10/03/2024 1.25     Urine Volume 24Hr 10/03/2024 1,400     Creat 24h Ur 10/03/2024 1350     Creatinine, Urine 10/03/2024 96.4     Cortisol Free Ur 10/03/2024 12     Cortisol Free 24h Ur 10/03/2024 17    Office Visit on 10/02/2024   Component Date Value    Progesterone 10/04/2024 0.37     Insulin 10/04/2024 15.0     Mullerian AMH 10/04/2024 0.600     Vitamin B12 10/04/2024 359     Ferritin 10/04/2024 169     Iron 10/04/2024 86     Transferrin 10/04/2024 274     Total Iron Binding [place]* 10/04/2024 408     % Saturation 10/04/2024 21     Free T4 10/04/2024 1.0     TSH 10/04/2024 3.455     T3 Free 10/04/2024 3.21     Testosterone Tot LC/MS 10/04/2024 9.1 (L)     Testost % Free+Weak Bnd 10/04/2024 27.2 (H)     Testost Free+Weak Bnd 10/04/2024 2.5     Sex Horm Bind Glob 10/04/2024 52.4     DHEA Sulfate 10/04/2024 91.2    [facility] Lab Encounter on 09/16/2024   Component Date Value    WBC 09/16/2024 7.6     RBC 09/16/2024 4.91     HGB 09/16/2024 14.7     HCT 09/16/2024 44.0     MCV 09/16/2024 89.6     MCH 09/16/2024 29.9     MCHC 09/16/2024 33.4     RDW-SD 09/16/2024 38.5     RDW 09/16/2024 11.8     PLT 09/16/2024 384.0     Neutrophil Absolute Prel* 09/16/2024 5.16     Neutrophil Absolute 09/16/2024 5.16     Lymphocyte Absolute 09/16/2024 1.71     Monocyte Absolute 09/16/2024 0.62     Eosinophil Absolute 09/16/2024 0.03     Basophil Absolute 09/16/2024 0.01     Immature Granulocyte Abs*  09/16/2024 0.02     Neutrophil % 09/16/2024 68.4     Lymphocyte % 09/16/2024 22.6     Monocyte % 09/16/2024 8.2     Eosinophil % 09/16/2024 0.4     Basophil % 09/16/2024 0.1     Immature Granulocyte % 09/16/2024 0.3     Glucose 09/16/2024 79     Sodium 09/16/2024 138     Potassium 09/16/2024 4.3     Chloride 09/16/2024 105     CO2 09/16/2024 26.0     Anion Gap 09/16/2024 7     BUN 09/16/2024 14     Creatinine 09/16/2024 0.89     BUN/CREA Ratio 09/16/2024 15.7     Calcium, Total 09/16/2024 9.8     Calculated Osmolality 09/16/2024 285     eGFR-Cr 09/16/2024 90     ALT 09/16/2024 10     AST 09/16/2024 17     Alkaline Phosphatase 09/16/2024 75     Bilirubin, Total 09/16/2024 0.6     Total Protein 09/16/2024 8.1     Albumin 09/16/2024 4.7     Globulin  09/16/2024 3.4     A/G Ratio 09/16/2024 1.4     Patient Fasting for CMP? 09/16/2024 No     Cholesterol, Total 09/16/2024 219 (H)     HDL Cholesterol 09/16/2024 60 (H)     Triglycerides 09/16/2024 134     LDL Cholesterol 09/16/2024 135 (H)     VLDL 09/16/2024 24     Non HDL Chol 09/16/2024 159 (H)     Patient Fasting for Lipi* 09/16/2024 No     TSH 09/16/2024 0.776     HgbA1C 09/16/2024 5.3     Estimated Average Glucose 09/16/2024 105     Urine Color 09/16/2024 Yellow     Clarity Urine 09/16/2024 Clear     Spec Gravity 09/16/2024 >1.030 (H)     Glucose Urine 09/16/2024 Normal     Bilirubin Urine 09/16/2024 Negative     Ketones Urine 09/16/2024 Trace (A)     Blood Urine 09/16/2024 Negative     pH Urine 09/16/2024 6.0     Protein Urine 09/16/2024 20 (A)     Urobilinogen Urine 09/16/2024 Normal     Nitrite Urine 09/16/2024 Negative     Leukocyte Esterase Urine 09/16/2024 Negative     Cortisol 09/16/2024 17.7     Ferritin 09/16/2024 222.6     Iron 09/16/2024 91     Transferrin 09/16/2024 300     Total Iron Binding [place]* 09/16/2024 447 (H)     % Saturation 09/16/2024 20     Vitamin D, 25OH, Total 09/16/2024 33.2        Current Outpatient Medications   Medication Sig Dispense  Refill    Diethylpropion HCl 25 MG Oral Tab Take 1 tablet by mouth 2 (two) times daily before meals. 60 tablet 2    bisacodyl 5 MG Oral Tab EC Take 1 tablet (5 mg total) by mouth daily as needed for constipation. 30 tablet 0    senna-docusate 8.6-50 MG Oral Tab Take 1 tablet by mouth daily. 30 tablet 0    Cholecalciferol 50 MCG (2000 UT) Oral Tab Vitamin D3 50 mcg (2,000 unit) tablet, [RxNorm: 768247]      spironolactone 100 MG Oral Tab Take 1 tablet (100 mg total) by mouth daily. Take 1 tablet daily for 1 week, then 2 tablets daily for 1 week then 3 tablets daily. 90 tablet 3    MAGNESIUM CITRATE OR Take by mouth at bedtime.      cyclobenzaprine 5 MG Oral Tab TAKE 1-2 TABLETS 2 HOURS BEFORE BEDTIME AS NEEDED (Patient not taking: Reported on 2/28/2025)        Past Medical History:    Constipation    Migraine    Rosacea    Scoliosis       Past Surgical History:   Procedure Laterality Date    Cosmetic surgery      liposunction x 3 times    Other surgical history  2020    Liposuction 360, wisdom teeth    [place] teeth removed         Social History:  Social History     Socioeconomic History    Marital status: Single   Tobacco Use    Smoking status: Never    Smokeless tobacco: Never   Vaping Use    Vaping status: Never Used   Substance and Sexual Activity    Alcohol use: Yes     Comment: Socially    Drug use: Never   Other Topics Concern    Grew up on a farm No    History of tanning No    Outdoor occupation No    Breast feeding No    Reaction to local anesthetic No    Pt has a pacemaker No    Pt has a defibrillator No     Social Drivers of Health     Food Insecurity: Not on File (9/26/2024)    Received from [facility]    Food [place]     Food: 0   Transportation Needs: Low Risk  (10/9/2023)    Received from [facility]    Transportation Needs     Do you have trouble getting transportation to medical appointments?: No     How do you normally get to and from your appointments?: Other   Stress: Not on  File (10/6/2022)    Received from [facility]    Stress     Stress: 0     Family History:  Family History   Problem Relation Age of Onset    Hypertension Mother     Obesity Mother     Cancer Maternal Grandfather     Diabetes Maternal Grandfather     Hypertension Maternal Grandfather     Obesity Maternal Grandfather     Cancer Maternal Grandmother     Dementia Maternal Grandmother     Depression Maternal Grandmother     Hypertension Maternal Grandmother     Cancer Paternal Grandfather     Stroke Paternal Grandmother     Depression Sister     Heart Disorder Sister     Hypertension Brother     Obesity Brother           REVIEW OF SYSTEMS:   10 point review of systems otherwise negative.  With the exception of HPI and assessment and plan        EXAM:     GENERAL: NAD, conversant  SKIN: good turgor, no jaundice  HEENT: nl external nose and ears  NECK: supple  LUNGS: nl effort, clear to auscultation bilaterally  CARDIO: RRR, no murmur  ABDOMEN: NT/ND, no masses  EXTREMITIES: gait normal, no edema   PSYCH: Oriented times three, appropriate affect              ASSESSMENT AND PLAN:     1. Mixed hyperlipidemia  2. Overweight with body mass index (BMI) of 27 to 27.9 in adult  3. Vitamin D deficiency     - Initial weight 173 lb (78.5 kg), Initial Body mass index is 27.5 kg/m².  - echo 12/2024; The left ventricle is normal in size, wall thickness and wall motion; there are no regional wall motion abnormalities. Global left ventricular systolic function is normal. The left ventricular ejection fraction is 62%. Left ventricular diastolic function is normal. 2.The right ventricle is normal in size. Right ventricular systolic function is normal.3.The anterior mitral leaflet is mildly thickened. The posterior mitral leaflet is mildly thickened Mild (1+) mitral regurgitation. Mild mitral valve prolapse is noted. 4.Mild (1+) pulmonic regurgitation.   -Diethylpropion : Since the patient would like to try Diethylpropion, and is aware of  the potential side effects (hypertension, palpitations, tachycardia, and anxiety), I will give [place] a prescription today to be used in conjunction with the above diet and exercise program. The patient will check heart rate and blood pressure on a regular basis. Patient will call me if the BP goes over 140/90 or has palpitations or racing heart rate. They understands that I will not call in the prescription and an appointment is needed to have the medication refilled.     Plan:  - Diethylpropion HCl 25 MG Oral Tab; Take 1 tablet by mouth 2 (two) times daily before meals.  Dispense: 60 tablet; Refill: 2    Regarding Obesity:  Follow up with dietitian and psychologist as recommended.  Discussed the role of sleep and stress in weight management.  Labs orders as above.  Counseled on comprehensive weight loss plan including attention to nutrition, exercise and behavior/stress management for success. See patient instruction below for more details.  Weight Loss Consent to treat reviewed and signed.    Regarding weight loss Medications.  Medication use and side effects reviewed with patient.    Medication will be used with a reduced calorie diet and increased physical activity in the management of exogenous obesity.  Patient will be responsible to let me know of any side effects or complications with medications.               No follow-ups on file.       Current Outpatient Medications:     Diethylpropion HCl 25 MG Oral Tab, Take 1 tablet by mouth 2 (two) times daily before meals., Disp: 60 tablet, Rfl: 2    bisacodyl 5 MG Oral Tab EC, Take 1 tablet (5 mg total) by mouth daily as needed for constipation., Disp: 30 tablet, Rfl: 0    senna-docusate 8.6-50 MG Oral Tab, Take 1 tablet by mouth daily., Disp: 30 tablet, Rfl: 0    Cholecalciferol 50 MCG (2000 UT) Oral Tab, Vitamin D3 50 mcg (2,000 unit) tablet, [RxNorm: 678959], Disp: , Rfl:     spironolactone 100 MG Oral Tab, Take 1 tablet (100 mg total) by mouth  daily. Take 1 tablet daily for 1 week, then 2 tablets daily for 1 week then 3 tablets daily., Disp: 90 tablet, Rfl: 3    MAGNESIUM CITRATE OR, Take by mouth at bedtime., Disp: , Rfl:     cyclobenzaprine 5 MG Oral Tab, TAKE 1-2 TABLETS 2 HOURS BEFORE BEDTIME AS NEEDED (Patient not taking: Reported on 2/28/2025), Disp: , Rfl:

## 2025-03-26 ENCOUNTER — TELEPHONE (OUTPATIENT)
Dept: OBGYN CLINIC | Facility: CLINIC | Age: 30
End: 2025-03-26

## 2025-03-26 DIAGNOSIS — Z11.3 ROUTINE SCREENING FOR STI (SEXUALLY TRANSMITTED INFECTION): Primary | ICD-10-CM

## 2025-03-26 DIAGNOSIS — R79.89 LOW SERUM PROGESTERONE: ICD-10-CM

## 2025-03-26 NOTE — TELEPHONE ENCOUNTER
Pt call for orders. Requests orders for STI screening due to new partner. Will schedule an office visit but wants to complete labs today. Also requests progesterone check as has a history of low progesterone and has been trying things to improve it. Ovulated 2 days ago. Discussed waiting until 7 days post ovulation to complete the progesterone lab or it is not accurate. Verbalized understanding.

## 2025-03-28 ENCOUNTER — LAB ENCOUNTER (OUTPATIENT)
Dept: LAB | Facility: HOSPITAL | Age: 30
End: 2025-03-28
Attending: ADVANCED PRACTICE MIDWIFE
Payer: COMMERCIAL

## 2025-03-28 ENCOUNTER — MED REC SCAN ONLY (OUTPATIENT)
Dept: FAMILY MEDICINE CLINIC | Facility: CLINIC | Age: 30
End: 2025-03-28

## 2025-03-28 ENCOUNTER — TELEPHONE (OUTPATIENT)
Dept: OBGYN CLINIC | Facility: CLINIC | Age: 30
End: 2025-03-28

## 2025-03-28 ENCOUNTER — TELEPHONE (OUTPATIENT)
Dept: FAMILY MEDICINE CLINIC | Facility: CLINIC | Age: 30
End: 2025-03-28

## 2025-03-28 DIAGNOSIS — Z11.3 ROUTINE SCREENING FOR STI (SEXUALLY TRANSMITTED INFECTION): ICD-10-CM

## 2025-03-28 DIAGNOSIS — R79.89 LOW SERUM PROGESTERONE: ICD-10-CM

## 2025-03-28 DIAGNOSIS — Z11.3 ROUTINE SCREENING FOR STI (SEXUALLY TRANSMITTED INFECTION): Primary | ICD-10-CM

## 2025-03-28 LAB
HCV AB SERPL QL IA: NONREACTIVE
PROGEST SERPL-MCNC: 0.25 NG/ML
T PALLIDUM AB SER QL IA: NONREACTIVE

## 2025-03-28 PROCEDURE — 86780 TREPONEMA PALLIDUM: CPT

## 2025-03-28 PROCEDURE — 87491 CHLMYD TRACH DNA AMP PROBE: CPT

## 2025-03-28 PROCEDURE — 87591 N.GONORRHOEAE DNA AMP PROB: CPT

## 2025-03-28 PROCEDURE — 86695 HERPES SIMPLEX TYPE 1 TEST: CPT

## 2025-03-28 PROCEDURE — 84144 ASSAY OF PROGESTERONE: CPT

## 2025-03-28 PROCEDURE — 87801 DETECT AGNT MULT DNA AMPLI: CPT

## 2025-03-28 PROCEDURE — 36415 COLL VENOUS BLD VENIPUNCTURE: CPT

## 2025-03-28 PROCEDURE — 86803 HEPATITIS C AB TEST: CPT

## 2025-03-28 PROCEDURE — 87389 HIV-1 AG W/HIV-1&-2 AB AG IA: CPT

## 2025-03-28 PROCEDURE — 86696 HERPES SIMPLEX TYPE 2 TEST: CPT

## 2025-03-28 PROCEDURE — 87661 TRICHOMONAS VAGINALIS AMPLIF: CPT

## 2025-03-28 NOTE — TELEPHONE ENCOUNTER
Outbound fax to [facility]. Faxed over \" [employer]- [employer] Adequacy Provision ( NAP) Exception Request Form\" Fax it to [phone]. Received confirmation and placed forms for scanning.

## 2025-03-28 NOTE — TELEPHONE ENCOUNTER
Outbound fax to [facility]. Faxed over \" [employer]- Network Adequacy Provision ( NAP) Exception Request Form\" Fax it to [phone]. Received confirmation and placed forms for scanning.

## 2025-03-31 LAB
C TRACH DNA SPEC QL NAA+PROBE: NEGATIVE
HSV 1 GLYCOPROTEIN G, IGG: POSITIVE
HSV 2 GLYCOPROTEIN G, IGG: NEGATIVE
N GONORRHOEA DNA SPEC QL NAA+PROBE: NEGATIVE
TRICH VAG NAA: NEGATIVE

## 2025-04-24 ENCOUNTER — TELEMEDICINE (OUTPATIENT)
Dept: SURGERY | Facility: CLINIC | Age: 30
End: 2025-04-24
Payer: COMMERCIAL

## 2025-04-24 DIAGNOSIS — E55.9 VITAMIN D DEFICIENCY: ICD-10-CM

## 2025-04-24 DIAGNOSIS — E78.2 MIXED HYPERLIPIDEMIA: Primary | ICD-10-CM

## 2025-04-24 DIAGNOSIS — Z51.81 THERAPEUTIC DRUG MONITORING: ICD-10-CM

## 2025-04-24 DIAGNOSIS — E66.3 OVERWEIGHT WITH BODY MASS INDEX (BMI) OF 27 TO 27.9 IN ADULT: ICD-10-CM

## 2025-04-24 RX ORDER — DIETHYLPROPION HYDROCHLORIDE 75 MG/1
1 TABLET, EXTENDED RELEASE ORAL DAILY
Qty: 30 TABLET | Refills: 1 | Status: SHIPPED | OUTPATIENT
Start: 2025-04-24 | End: 2025-06-23

## 2025-04-24 NOTE — PROGRESS NOTES
Patient ID: Paz Paz is a 30 year old female.  No chief complaint on file.         HISTORY OF PRESENT ILLNESS:   Patient presents for above.  This visit is conducted using Telemedicine with live, interactive video and audio.    Initial weight: 173 lbs 2/2025  Current weight: 167 lbs  Interval weight loss: 6 lbs  Total weight loss: 6 lbs    Review of Systems   All other systems reviewed and are negative.     MEDICAL HISTORY:   Past Medical History[1]    Past Surgical History[2]    Medications - Current[3]    Allergies:Allergies[4]    Social History     Socioeconomic History    Marital status: Single     Spouse name: Not on file    Number of children: Not on file    Years of education: Not on file    Highest education level: Not on file   Occupational History    Not on file   Tobacco Use    Smoking status: Never    Smokeless tobacco: Never   Vaping Use    Vaping status: Never Used   Substance and Sexual Activity    Alcohol use: Yes     Comment: Socially    Drug use: Never    Sexual activity: Not on file   Other Topics Concern    Grew up on a farm No    History of tanning No    Outdoor occupation No    Breast feeding No    Reaction to local anesthetic No    Pt has a pacemaker No    Pt has a defibrillator No   Social History Narrative    Not on file     Social Drivers of Health     Food Insecurity: Not on File (9/26/2024)    Received from [facility]    Food Insecurity     Food: 0   Transportation Needs: Low Risk  (10/9/2023)    Received from [facility]    Transportation Needs     Do you have trouble getting transportation to medical appointments?: No     How do you normally get to and from your appointments?: Other   Stress: Not on File (10/6/2022)    Received from [facility]    Stress     Stress: 0   Housing Stability: Not on file (10/9/2023)       PHYSICAL EXAM:   Unable to perform vitals or do physical exam as this is a virtual video visit.  Patient appears alert.  No conversational dyspnea or  distress.    ASSESSMENT/PLAN:     1. Mixed hyperlipidemia  2. Overweight with body mass index (BMI) of 27 to 27.9 in adult  3. Vitamin D deficiency  4. Therapeutic drug monitoring     - Initial weight 173 lb (78.5 kg), Initial Body mass index is 27.5 kg/m².  - Tried phentermine 2023 x 5 months, got HTN and blurry vision  - Hx SVT 11/2024, seen an EP was cleared with extensive workup was on Cardizem   - Echo 12/2024; The left ventricle is normal in size, wall thickness and wall motion; there are no regional wall motion abnormalities. Global left ventricular systolic function is normal. The left ventricular ejection fraction is 62%. Left ventricular diastolic function is normal. 2.The right ventricle is normal in size. Right ventricular systolic function is normal.3.The anterior mitral leaflet is mildly thickened. The posterior mitral leaflet is mildly thickened Mild (1+) mitral regurgitation. Mild mitral valve prolapse is noted. 4.Mild (1+) pulmonic regurgitation.   -Diethylpropion : Since the patient would like to try Diethylpropion, and is aware of the potential side effects (hypertension, palpitations, tachycardia, and anxiety), I will give Paz Paz a prescription today to be used in conjunction with the above diet and exercise program. The patient will check heart rate and blood pressure on a regular basis. Patient will call me if the BP goes over 140/90 or has palpitations or racing heart rate. They understands that I will not call in the prescription and an appointment is needed to have the medication refilled.      Plan:  - Diethylpropion switch to 75 mg daily    Return in about 2 months (around 6/24/2025).    Time spent on encounter  30 minutes   Video time 15 minutes   Documentation time 15 minutes     Paz Paz understands video evaluation is not a substitute for face-to-face examination or emergency care. Patient advised to go to ER or call 911 for worsening symptoms or acute  distress.     [facility]  Telehealth Verbal Consent   I conducted a telehealth visit with Paz Paz today, 04/24/25, which was completed using two-way, real-time interactive audio and video communication. This has been done in good robyn to provide continuity of care in the best interest of the provider-patient relationship, due to the COVID - public health crisis/national emergency where restrictions of face-to-face office visits are ongoing. Every conscious effort was taken to allow for sufficient and adequate time to complete the visit.  The patient was made aware of the limitations of the telehealth visit, including treatment limitations as no physical exam could be performed.  The patient was advised to call 911 or to go to the ER in case there was an emergency.  The patient was also advised of the potential privacy & security concerns related to the telehealth platform.   The patient was made aware of where to find [facility]'s notice of privacy practices, telehealth consent form and other related consent forms and documents.  which are located on the [facility] website. The patient verbally agreed to telehealth consent form, related consents and the risks discussed.    Lastly, the patient confirmed that they were in [place].   Included in this visit, time may have been spent reviewing labs, medications, radiology tests and decision making. Appropriate medical decision-making and tests are ordered as detailed in the plan of care above.  Coding/billing information is submitted for this visit based on complexity of care and/or time spent for the visit.    This note was prepared using Dragon Medical voice recognition dictation software. As a result errors may occur. When identified these errors have been corrected. While every attempt is made to correct errors during dictation discrepancies may still exist.    Yovana Vargas MD  4/24/2025         [1]   Past Medical History:   Constipation     Migraine    Rosacea    Scoliosis   [2]   Past Surgical History:  Procedure Laterality Date    Cosmetic surgery      liposunction x 3 times    Other surgical history  2020    Liposuction 360, wisdom teeth    [place] teeth removed     [3]   Current Outpatient Medications:     bisacodyl 5 MG Oral Tab EC, Take 1 tablet (5 mg total) by mouth daily as needed for constipation., Disp: 30 tablet, Rfl: 0    senna-docusate 8.6-50 MG Oral Tab, Take 1 tablet by mouth daily., Disp: 30 tablet, Rfl: 0    Cholecalciferol 50 MCG (2000 UT) Oral Tab, Vitamin D3 50 mcg (2,000 unit) tablet, [RxNorm: 261218], Disp: , Rfl:     cyclobenzaprine 5 MG Oral Tab, TAKE 1-2 TABLETS 2 HOURS BEFORE BEDTIME AS NEEDED (Patient not taking: Reported on 2/28/2025), Disp: , Rfl:     spironolactone 100 MG Oral Tab, Take 1 tablet (100 mg total) by mouth daily. Take 1 tablet daily for 1 week, then 2 tablets daily for 1 week then 3 tablets daily., Disp: 90 tablet, Rfl: 3    MAGNESIUM CITRATE OR, Take by mouth at bedtime., Disp: , Rfl:   [4]   Allergies  Allergen Reactions    Latex HIVES, ITCHING, RASH and SWELLING

## 2025-05-13 DIAGNOSIS — Z11.3 SCREENING EXAMINATION FOR STI: Primary | ICD-10-CM

## 2025-05-22 ENCOUNTER — TELEMEDICINE (OUTPATIENT)
Dept: SURGERY | Facility: CLINIC | Age: 30
End: 2025-05-22
Payer: COMMERCIAL

## 2025-05-22 DIAGNOSIS — E66.3 OVERWEIGHT WITH BODY MASS INDEX (BMI) OF 27 TO 27.9 IN ADULT: ICD-10-CM

## 2025-05-22 DIAGNOSIS — E78.2 MIXED HYPERLIPIDEMIA: Primary | ICD-10-CM

## 2025-05-22 DIAGNOSIS — G43.819 OTHER MIGRAINE WITHOUT STATUS MIGRAINOSUS, INTRACTABLE: ICD-10-CM

## 2025-05-22 DIAGNOSIS — E55.9 VITAMIN D DEFICIENCY: ICD-10-CM

## 2025-05-22 DIAGNOSIS — Z51.81 THERAPEUTIC DRUG MONITORING: ICD-10-CM

## 2025-05-22 PROCEDURE — 98006 SYNCH AUDIO-VIDEO EST MOD 30: CPT | Performed by: INTERNAL MEDICINE

## 2025-05-22 RX ORDER — TOPIRAMATE 25 MG/1
25 TABLET, FILM COATED ORAL 2 TIMES DAILY
Qty: 60 TABLET | Refills: 2 | Status: SHIPPED | OUTPATIENT
Start: 2025-05-22 | End: 2025-08-20

## 2025-05-22 RX ORDER — DIETHYLPROPION HYDROCHLORIDE 75 MG/1
1 TABLET, EXTENDED RELEASE ORAL DAILY
Qty: 30 TABLET | Refills: 2 | Status: SHIPPED | OUTPATIENT
Start: 2025-05-22 | End: 2025-08-20

## 2025-05-22 NOTE — PROGRESS NOTES
Patient ID: Paz Paz is a 30 year old female.  No chief complaint on file.         HISTORY OF PRESENT ILLNESS:   Patient presents for above.  This visit is conducted using Telemedicine with live, interactive video and audio.    Initial weight: 173 lbs 2/2025  Current weight: 165 lbs  Interval weight loss: 2 lbs  Total weight loss: 8 lbs    Review of Systems   All other systems reviewed and are negative.     MEDICAL HISTORY:   Past Medical History[1]    Past Surgical History[2]    Medications - Current[3]    Allergies:Allergies[4]    Social History     Socioeconomic History    Marital status: Single     Spouse name: Not on file    Number of children: Not on file    Years of education: Not on file    Highest education level: Not on file   Occupational History    Not on file   Tobacco Use    Smoking status: Never    Smokeless tobacco: Never   Vaping Use    Vaping status: Never Used   Substance and Sexual Activity    Alcohol use: Yes     Comment: Socially    Drug use: Never    Sexual activity: Not on file   Other Topics Concern    Grew up on a farm No    History of tanning No    Outdoor occupation No    Breast feeding No    Reaction to local anesthetic No    Pt has a pacemaker No    Pt has a defibrillator No   Social History Narrative    Not on file     Social Drivers of Health     Food Insecurity: Not on File (9/26/2024)    Received from [facility]    Food Insecurity     Food: 0   Transportation Needs: Low Risk  (10/9/2023)    Received from [facility]    Transportation Needs     Do you have trouble getting transportation to medical appointments?: No     How do you normally get to and from your appointments?: Other   Stress: Not on File (10/6/2022)    Received from [facility]    Stress     Stress: 0   Housing Stability: Not on file (10/9/2023)       PHYSICAL EXAM:   Unable to perform vitals or do physical exam as this is a virtual video visit.  Patient appears alert.  No conversational dyspnea or  distress.    ASSESSMENT/PLAN:     1. Mixed hyperlipidemia  2. Overweight with body mass index (BMI) of 27 to 27.9 in adult  3. Vitamin D deficiency  4. Therapeutic drug monitoring  5. Other migraine without status migrainosus, intractable    - Initial weight 173 lb (78.5 kg), Initial Body mass index is 27.5 kg/m².  - Tried phentermine 2023 x 5 months, got HTN and blurry vision  - Hx SVT 11/2024, seen an EP was cleared with extensive workup was on Cardizem   - Echo 12/2024; The left ventricle is normal in size, wall thickness and wall motion; there are no regional wall motion abnormalities. Global left ventricular systolic function is normal. The left ventricular ejection fraction is 62%. Left ventricular diastolic function is normal. 2.The right ventricle is normal in size. Right ventricular systolic function is normal.3.The anterior mitral leaflet is mildly thickened. The posterior mitral leaflet is mildly thickened Mild (1+) mitral regurgitation. Mild mitral valve prolapse is noted. 4.Mild (1+) pulmonic regurgitation.   -Diethylpropion : Since the patient would like to try Diethylpropion, and is aware of the potential side effects (hypertension, palpitations, tachycardia, and anxiety), I will give Paz Paz a prescription today to be used in conjunction with the above diet and exercise program. The patient will check heart rate and blood pressure on a regular basis. Patient will call me if the BP goes over 140/90 or has palpitations or racing heart rate. They understands that I will not call in the prescription and an appointment is needed to have the medication refilled.    -Topiramate; patient denies any personal history of Suicide and Kidney stones, glaucoma, pregnancy. Discussed getting annual eye exams to rule out glaucoma.   Plan:  - Diethylpropion switch to 75 mg daily    Return in about 3 months (around 8/22/2025).    Time spent on encounter  30 minutes   Video time 15 minutes   Documentation  time 15 minutes     Paz Paz understands video evaluation is not a substitute for face-to-face examination or emergency care. Patient advised to go to ER or call 911 for worsening symptoms or acute distress.     Telehealth outside of [facility]  Telehealth Verbal Consent   I conducted a telehealth visit with Paz Paz today, 04/24/25, which was completed using two-way, real-time interactive audio and video communication. This has been done in good robyn to provide continuity of care in the best interest of the provider-patient relationship, due to the COVID -19 public health crisis/national emergency where restrictions of face-to-face office visits are ongoing. Every conscious effort was taken to allow for sufficient and adequate time to complete the visit.  The patient was made aware of the limitations of the telehealth visit, including treatment limitations as no physical exam could be performed.  The patient was advised to call 911 or to go to the ER in case there was an emergency.  The patient was also advised of the potential privacy & security concerns related to the telehealth platform.   The patient was made aware of where to find [facility]'s notice of privacy practices, telehealth consent form and other related consent forms and documents.  which are located on the [facility] website. The patient verbally agreed to telehealth consent form, related consents and the risks discussed.    Lastly, the patient confirmed that they were in [place].   Included in this visit, time may have been spent reviewing labs, medications, radiology tests and decision making. Appropriate medical decision-making and tests are ordered as detailed in the plan of care above.  Coding/billing information is submitted for this visit based on complexity of care and/or time spent for the visit.    This note was prepared using Dragon Medical voice recognition dictation software. As a result errors may occur. When identified these  errors have been corrected. While every attempt is made to correct errors during dictation discrepancies may still exist.    Yovana Vargas MD         [1]   Past Medical History:   Constipation    Migraine    Rosacea    Scoliosis   [2]   Past Surgical History:  Procedure Laterality Date    Cosmetic surgery      liposunction x 3 times    Other surgical history  2020    Liposuction 360, wisdom teeth    [place] teeth removed     [3]   Current Outpatient Medications:     Diethylpropion HCl ER 75 MG Oral Tablet 24 Hr, Take 1 tablet (75 mg total) by mouth daily., Disp: 30 tablet, Rfl: 2    topiramate 25 MG Oral Tab, Take 1 tablet (25 mg total) by mouth 2 (two) times daily., Disp: 60 tablet, Rfl: 2    bisacodyl 5 MG Oral Tab EC, Take 1 tablet (5 mg total) by mouth daily as needed for constipation., Disp: 30 tablet, Rfl: 0    senna-docusate 8.6-50 MG Oral Tab, Take 1 tablet by mouth daily., Disp: 30 tablet, Rfl: 0    Cholecalciferol 50 MCG (2000 UT) Oral Tab, Vitamin D3 50 mcg (2,000 unit) tablet, [RxNorm: 630651], Disp: , Rfl:     cyclobenzaprine 5 MG Oral Tab, TAKE 1-2 TABLETS 2 HOURS BEFORE BEDTIME AS NEEDED (Patient not taking: Reported on 2/28/2025), Disp: , Rfl:     spironolactone 100 MG Oral Tab, Take 1 tablet (100 mg total) by mouth daily. Take 1 tablet daily for 1 week, then 2 tablets daily for 1 week then 3 tablets daily., Disp: 90 tablet, Rfl: 3    MAGNESIUM CITRATE OR, Take by mouth at bedtime., Disp: , Rfl:   [4]   Allergies  Allergen Reactions    Latex HIVES, ITCHING, RASH and SWELLING

## 2025-05-28 ENCOUNTER — OFFICE VISIT (OUTPATIENT)
Dept: OBGYN CLINIC | Facility: CLINIC | Age: 30
End: 2025-05-28

## 2025-05-28 VITALS
HEART RATE: 81 BPM | SYSTOLIC BLOOD PRESSURE: 110 MMHG | BODY MASS INDEX: 26.52 KG/M2 | WEIGHT: 165 LBS | HEIGHT: 66 IN | DIASTOLIC BLOOD PRESSURE: 77 MMHG

## 2025-05-28 DIAGNOSIS — N89.8 VAGINAL DRYNESS: ICD-10-CM

## 2025-05-28 DIAGNOSIS — Z11.3 SCREENING EXAMINATION FOR STI: ICD-10-CM

## 2025-05-28 DIAGNOSIS — Z01.419 ENCOUNTER FOR ANNUAL ROUTINE GYNECOLOGICAL EXAMINATION: Primary | ICD-10-CM

## 2025-05-28 DIAGNOSIS — N93.0 POSTCOITAL BLEEDING: ICD-10-CM

## 2025-05-28 DIAGNOSIS — N92.6 IRREGULAR UTERINE BLEEDING: ICD-10-CM

## 2025-05-28 LAB
CONTROL LINE PRESENT WITH A CLEAR BACKGROUND (YES/NO): YES YES/NO
KIT LOT #: NORMAL NUMERIC
PREGNANCY TEST, URINE: NEGATIVE

## 2025-05-28 PROCEDURE — 99395 PREV VISIT EST AGE 18-39: CPT | Performed by: ADVANCED PRACTICE MIDWIFE

## 2025-05-28 PROCEDURE — 81025 URINE PREGNANCY TEST: CPT | Performed by: ADVANCED PRACTICE MIDWIFE

## 2025-05-28 RX ORDER — DOXYCYCLINE HYCLATE 50 MG/1
1 TABLET, FILM COATED ORAL 2 TIMES DAILY
COMMUNITY
Start: 2025-05-14

## 2025-05-28 NOTE — PROGRESS NOTES
Chief Complaint:   Chief Complaint   Patient presents with    Annual     Last pap 10/24/2024        HPI:     Paz is 30 year old female, here today for annual exam  Patient's last menstrual period was 05/15/2025 (exact date).. Hx Prior Abnormal Pap: Yes   Denies abnormal discharge or vaginal irritation.     Last two weeks has had period every two weeks. Starts having spotting after intercourse and then turns into full period like bleeding    Has history of chronic yeast infections with antibiotics. Currently taking antibiotics. Desires yeast swab    She has had labs done with her PCP and was referred to reproductive endocrinology due to low progesterone,. She states she tried to make an appointment but was told needed approval. PCP was able to get approval through insurance but [facility] never reached back out to schedule    Current Partners: Male. Two partners. Feels safe in relationship.  Birth Control Method: Nothing. Concern for infertility  H/O of STI's: chlamydia, gonorrhea, HSV  Last STI screen: 3/31/25  Desires this testing today  Additional information:      Last Pap: 10/24/24, NILM      H/O abnormal Pap: 10/2023 colpo MANUELITO 1      Last mammogram (if applicable): n/a  No family history of breast cancer    Declines HPV vaccine    Socially drinks  Denies smoking   Denies drug use     Patient offered a chaperone for exam. Patient declines      HISTORY:  Past Medical History[1]   Past Surgical History[2]   Family History[3]   Social History: Short Social Hx on File[4]     Medications (Active prior to today's visit):  Current Medications[5]    Allergies:  Allergies[6]    HISTORY:  Past Medical History[7]   Past Surgical History[8]   Family History[9]   Social History: Short Social Hx on File[10]     Medications (Active prior to today's visit):  Current Medications[11]    Allergies:  Allergies[12]       ROS:     Cardiovascular:  Negative forirregular heartbeat/palpitations. Negative for chest pain  Constitutional:   Negative for decreased activity, fever, irritability and lethargy  Gastrointestinal:  Negative for abdominal pain, constipation, decreased appetite, diarrhea and vomiting  Genitourinary:  Negative for dysuria and hematuria  Reproductive: Negative for vaginal discharge, itching, abnormal bleeding  Hema/Lymph:  Negative for easy bleeding and easy bruising  Neurological:  Negative for gait disturbance or dizziness  Psychiatric:  Negative for inappropriate interaction and psychiatric symptoms  Respiratory:  Negative for cough, dyspnea and wheezing      PHYSICAL EXAM:   Constitutional: appears well hydrated alert and responsive no acute distress noted  Neck/Thyroid: neck is supple without adenopathy No thyromegaly  Lymphatic: no abnormal cervical, supraclavicular or axillary adenopathy is noted  Breast: normal on inspection without masses  Respiratory: normal to inspection lungs are clear to auscultation bilaterally normal respiratory effort  Cardiovascular: regular rate and rhythm no murmurs, gallups, or rubs  Abdomen: soft non-tender non-distended no organomegaly noted no masses  Genitourinary: normal female genitalia            Vulva/Labia: Appear normal, no lesions           Vagina: Normal, no discharge           Cervix: No CMT, no mucopurulent discharge           Uterus: non-tender, no masses, not enlarged            Adnexa: not enlarged, non--tender  Rectal: anus appears normal  Musculoskeletal: Normal strength, no swelling  Integumentary: Warm, Dry, appropriate color, Intact  Neurologic: Alert, Oriented  Psychiatric: Cooperative, appropriate mood and affect            ASSESSMENT/PLAN:   Assessment   Encounter Diagnoses   Name Primary?    Vaginal dryness     Screening examination for STI     Postcoital bleeding     Irregular uterine bleeding     Encounter for annual routine gynecological examination Yes       Normal gyne exam. Pap with HPV to lab due to postcoital bleeding.   STD testing- GC/CT ordered. Declines  HIV, Treponemal, Hep B   Diet/excercise discussed  Discussed breast self awareness  Recommend multivitamin with folic acid  Discussed recommendation for contraceptive use if not planning pregnancy as there is no guarantee that she would not be fertile.  Discussed recommendation for HPV vaccine  If unable to get in with [facility] referred to by PCP, may try Dr. Maddison Shi         Orders This Visit:  Orders Placed This Encounter   Procedures    POC Urine pregnancy test [72758]    Hpv Dna  High Risk , Thin Prep Collect    ThinPrep PAP Smear    Vaginitis Vaginosis PCR Panel    Chlamydia/Gc Amplification       Meds This Visit:  Requested Prescriptions      No prescriptions requested or ordered in this encounter       Imaging & Referrals:  None     5/28/2025  Ivory Noriega CNM      Return to care in 1 year          [1]   Past Medical History:   Constipation    Migraine    Rosacea    Scoliosis   [2]   Past Surgical History:  Procedure Laterality Date    Cosmetic surgery      liposunction x 3 times    Other surgical history  2020    Liposuction 360, wisdom teeth    [place] teeth removed     [3]   Family History  Problem Relation Age of Onset    Hypertension Mother     Obesity Mother     Cancer Maternal Grandfather     Diabetes Maternal Grandfather     Hypertension Maternal Grandfather     Obesity Maternal Grandfather     Cancer Maternal Grandmother     Dementia Maternal Grandmother     Depression Maternal Grandmother     Hypertension Maternal Grandmother     Cancer Paternal Grandfather     Stroke Paternal Grandmother     Depression Sister     Heart Disorder Sister     Hypertension Brother     Obesity Brother    [4]   Social History  Socioeconomic History    Marital status: Single   Tobacco Use    Smoking status: Never    Smokeless tobacco: Never   Vaping Use    Vaping status: Never Used   Substance and Sexual Activity    Alcohol use: Yes     Comment: Socially    Drug use: Never    Sexual activity: Yes     Partners: Male    Other Topics Concern    Grew up on a farm No    History of tanning No    Outdoor occupation No    Breast feeding No    Reaction to local anesthetic No    Pt has a pacemaker No    Pt has a defibrillator No     Social Drivers of Health     Food Insecurity: No Food Insecurity (5/28/2025)    NCSS - Food Insecurity     Worried About Running Out of Food in the Last Year: No     Ran Out of Food in the Last Year: No   Transportation Needs: No Transportation Needs (5/28/2025)    NCSS - Transportation     Lack of Transportation: No   Stress: Not on File (10/6/2022)    Received from [employer]    Stress     Stress: 0   Housing Stability: Not At Risk (5/28/2025)    NCSS - Housing/Utilities     Has Housing: Yes     Worried About Losing Housing: No     Unable to Get Utilities: No   [5]   Current Outpatient Medications   Medication Sig Dispense Refill    Doxycycline Hyclate 50 MG Oral Tab Take 1 tablet by mouth 2 (two) times daily.      Diethylpropion HCl ER 75 MG Oral Tablet 24 Hr Take 1 tablet (75 mg total) by mouth daily. 30 tablet 2    topiramate 25 MG Oral Tab Take 1 tablet (25 mg total) by mouth 2 (two) times daily. 60 tablet 2    bisacodyl 5 MG Oral Tab EC Take 1 tablet (5 mg total) by mouth daily as needed for constipation. 30 tablet 0    senna-docusate 8.6-50 MG Oral Tab Take 1 tablet by mouth daily. 30 tablet 0    Cholecalciferol 50 MCG (2000 UT) Oral Tab Vitamin D3 50 mcg (2,000 unit) tablet, [RxNorm: 214902]      spironolactone 100 MG Oral Tab Take 1 tablet (100 mg total) by mouth daily. Take 1 tablet daily for 1 week, then 2 tablets daily for 1 week then 3 tablets daily. 90 tablet 3    MAGNESIUM CITRATE OR Take by mouth at bedtime.      cyclobenzaprine 5 MG Oral Tab TAKE 1-2 TABLETS 2 HOURS BEFORE BEDTIME AS NEEDED (Patient not taking: Reported on 5/28/2025)     [6]   Allergies  Allergen Reactions    Latex HIVES, ITCHING, RASH and SWELLING   [7]   Past Medical History:   Constipation    Migraine    Rosacea    Scoliosis    [8]   Past Surgical History:  Procedure Laterality Date    Cosmetic surgery      liposunction x 3 times    Other surgical history  2020    Liposuction 360, wisdom teeth    [place] teeth removed     [9]   Family History  Problem Relation Age of Onset    Hypertension Mother     Obesity Mother     Cancer Maternal Grandfather     Diabetes Maternal Grandfather     Hypertension Maternal Grandfather     Obesity Maternal Grandfather     Cancer Maternal Grandmother     Dementia Maternal Grandmother     Depression Maternal Grandmother     Hypertension Maternal Grandmother     Cancer Paternal Grandfather     Stroke Paternal Grandmother     Depression Sister     Heart Disorder Sister     Hypertension Brother     Obesity Brother    [10]   Social History  Socioeconomic History    Marital status: Single   Tobacco Use    Smoking status: Never    Smokeless tobacco: Never   Vaping Use    Vaping status: Never Used   Substance and Sexual Activity    Alcohol use: Yes     Comment: Socially    Drug use: Never    Sexual activity: Yes     Partners: Male   Other Topics Concern    Grew up on a farm No    History of tanning No    Outdoor occupation No    Breast feeding No    Reaction to local anesthetic No    Pt has a pacemaker No    Pt has a defibrillator No     Social Drivers of Health     Food Insecurity: No Food Insecurity (5/28/2025)    NCSS - Food Insecurity     Worried About Running Out of Food in the Last Year: No     Ran Out of Food in the Last Year: No   Transportation Needs: No Transportation Needs (5/28/2025)    NCSS - Transportation     Lack of Transportation: No   Stress: Not on File (10/6/2022)    Received from [facility]    Stress     Stress: 0   Housing Stability: Not At Risk (5/28/2025)    NCSS - Housing/Utilities     Has Housing: Yes     Worried About Losing Housing: No     Unable to Get Utilities: No   [11]   Current Outpatient Medications   Medication Sig Dispense Refill    Doxycycline Hyclate 50 MG Oral Tab Take 1 tablet by  mouth 2 (two) times daily.      Diethylpropion HCl ER 75 MG Oral Tablet 24 Hr Take 1 tablet (75 mg total) by mouth daily. 30 tablet 2    topiramate 25 MG Oral Tab Take 1 tablet (25 mg total) by mouth 2 (two) times daily. 60 tablet 2    bisacodyl 5 MG Oral Tab EC Take 1 tablet (5 mg total) by mouth daily as needed for constipation. 30 tablet 0    senna-docusate 8.6-50 MG Oral Tab Take 1 tablet by mouth daily. 30 tablet 0    Cholecalciferol 50 MCG (2000 UT) Oral Tab Vitamin D3 50 mcg (2,000 unit) tablet, [RxNorm: 279291]      spironolactone 100 MG Oral Tab Take 1 tablet (100 mg total) by mouth daily. Take 1 tablet daily for 1 week, then 2 tablets daily for 1 week then 3 tablets daily. 90 tablet 3    MAGNESIUM CITRATE OR Take by mouth at bedtime.      cyclobenzaprine 5 MG Oral Tab TAKE 1-2 TABLETS 2 HOURS BEFORE BEDTIME AS NEEDED (Patient not taking: Reported on 5/28/2025)     [12]   Allergies  Allergen Reactions    Latex HIVES, ITCHING, RASH and SWELLING

## 2025-05-29 LAB
BV BACTERIA DNA VAG QL NAA+PROBE: NEGATIVE
C GLABRATA DNA VAG QL NAA+PROBE: NEGATIVE
C KRUSEI DNA VAG QL NAA+PROBE: NEGATIVE
C TRACH DNA SPEC QL NAA+PROBE: NEGATIVE
CANDIDA DNA VAG QL NAA+PROBE: NEGATIVE
HPV E6+E7 MRNA CVX QL NAA+PROBE: POSITIVE
N GONORRHOEA DNA SPEC QL NAA+PROBE: NEGATIVE
T VAGINALIS DNA VAG QL NAA+PROBE: NEGATIVE

## 2025-06-03 ENCOUNTER — TELEPHONE (OUTPATIENT)
Dept: OBGYN CLINIC | Facility: CLINIC | Age: 30
End: 2025-06-03

## 2025-06-03 LAB
HPV16 DNA CVX QL PROBE+SIG AMP: NEGATIVE
HPV18 DNA CVX QL PROBE+SIG AMP: NEGATIVE

## 2025-06-03 NOTE — TELEPHONE ENCOUNTER
Phone call to patient to review pap results and recommendation for colpo. She states she is terrified of the colpo because it was so painful last time. Discussed option for giving anxiety medication ahead of time to help. Informed her that Sharyn and Maddison both do colpos so she can schedule with either one of them. Also discussed recommendation for HPV vaccine. Informed her she can schedule an RN visit for vaccine.

## 2025-06-11 ENCOUNTER — OFFICE VISIT (OUTPATIENT)
Dept: DERMATOLOGY CLINIC | Facility: CLINIC | Age: 30
End: 2025-06-11
Payer: COMMERCIAL

## 2025-06-11 DIAGNOSIS — D22.9 MULTIPLE BENIGN NEVI: ICD-10-CM

## 2025-06-11 DIAGNOSIS — L81.4 LENTIGINES: ICD-10-CM

## 2025-06-11 DIAGNOSIS — L70.0 ACNE VULGARIS: ICD-10-CM

## 2025-06-11 DIAGNOSIS — L82.1 SEBORRHEIC KERATOSES: Primary | ICD-10-CM

## 2025-06-11 DIAGNOSIS — D18.01 CHERRY ANGIOMA: ICD-10-CM

## 2025-06-11 PROCEDURE — 99214 OFFICE O/P EST MOD 30 MIN: CPT | Performed by: STUDENT IN AN ORGANIZED HEALTH CARE EDUCATION/TRAINING PROGRAM

## 2025-06-11 RX ORDER — EMOLLIENT BASE
1 CREAM (GRAM) TOPICAL AS NEEDED
COMMUNITY
Start: 2025-05-09

## 2025-06-11 RX ORDER — MINOXIDIL 2.5 MG/1
1.5 TABLET ORAL DAILY
COMMUNITY

## 2025-06-11 RX ORDER — DOXYCYCLINE 50 MG/1
50 TABLET ORAL DAILY
COMMUNITY
Start: 2025-05-05

## 2025-06-11 NOTE — PROGRESS NOTES
Established patient     Referred by:   No referring provider defined for this encounter.     CHIEF COMPLAINT: FBSE    HISTORY OF PRESENT ILLNESS: .    1. Growth   Location: Lower back   Duration: Unknown  Bleeding, growing, changing?: No- but was recommended to be assessed when patient was seen at [facility]   Scaly?:No    Itchy?:No    Current treatment: None  Past treatments: None    2. Rosacea   Location: Face   Duration: x15 + years  Signs and symptoms: Small blood vessels, redness, inflammation   Current treatment: Azelaic Acid, Zinc soap, Vanicream  Past treatments: Laser treatment     3. Acne   Location: Face   Duration: Chronic  Signs and symptoms: Combination of cystic/inflammatory acne and comedones  Current treatment: Spironolactone 100mg daily  Past treatments: Microneedling, Doxycycline 100mg daily     DERM HISTORY:  History of skin cancer: No  History of melanoma: No  History of skin disease/conditions: Yes (rosacea, acne)    FAMILY HISTORY:  History of melanoma: Yes: Paternal grandma, Grandparents, Paternal aunt    PAST MEDICAL HISTORY:  Past Medical History[1]    REVIEW OF SYSTEMS:  Constitutional: Denies fever, chills, unintentional weight loss.   Skin as per HPI    Medications  Current Medications[2]    PHYSICAL EXAM:  Patient declined chaperone   General: awake, alert, no acute distress  Skin: Skin exam was performed today including the following: head and face, scalp, neck, chest (including breasts and axillae), abdomen, back, bilateral upper extremities, bilateral lower extremities, hands, feet, digits, nails. Pertinent findings include:   - Scattered bright red-purple dome-shaped papules on the trunk and extremities   - Scattered light brown stellate macules on sun exposed sites  - Scattered, evenly colored, round brown macules and papules with regular borders on the trunk and extremities  - Numerous scattered skin-colored and brown, waxy, stuck-on papules and plaques on the trunk and  extremities      ASSESSMENT & PLAN:  Pathophysiology of diagnoses discussed with patient.  Therapeutic options reviewed. Risks, benefits, and alternatives discussed with patient. Instructions reviewed at length.    #Lentigines  #Seborrheic keratoses   #Cherry angiomas   - Reassurance provided regarding the benign nature of these lesions.    #Multiple benign nevi  - Complete skin exam performed today with no outlier lesions identified   - Reassured patient of benign nature of these lesions.   - Recommend daily photoprotection with broad-spectrum sunscreen, avoidance of sun during peak hours, and sun protective clothing.    - Dermoscopy was used for physical examination of pigmented lesions during today's office visit.    #Acne Vulgaris   - Continue spironolactone 100mg once per day  - Discussed off-label use for acne vulgaris  - Explained potential risks of therapy, included but not limited to hyperkalemia, hypotension, breast tenderness/enlargement, irregular menses and black box warning regarding increased risk of malignancy at higher doses. Patient expressed understanding of these risks and would like trial of therapy.  - Explained she must remain on a form of contraception (IUD or OCP) during therapy and for 1 ovulatory cycle after, given risk of feminization of male fetus.  - Instructed to maintain hydration while on therapy and STOP during times of significant dehydration, as risk for electrolyte abnormality increases.      Return to clinic: 1 year  or sooner if something concerning arises     John Johnson MD    By signing my name below, ISarath MA,  attest that this documentation has been prepared under the direction and in the presence of John Johnson MD.   Electronically Signed: Sarath HOPKINS MA, 6/11/2025, 1:33 PM.    IJohn MD,  personally performed the services described in this documentation. All medical record entries made by the scribe were at my direction and in my presence.  I  have reviewed the chart and agree that the record reflects my personal performance and is accurate and complete.  John Johnson MD, 6/11/2025, 2:41 PM             [1]   Past Medical History:   Constipation    Migraine    Rosacea    Scoliosis   [2]   Current Outpatient Medications   Medication Sig Dispense Refill    Doxycycline Hyclate 50 MG Oral Tab Take 1 tablet by mouth 2 (two) times daily.      Diethylpropion HCl ER 75 MG Oral Tablet 24 Hr Take 1 tablet (75 mg total) by mouth daily. 30 tablet 2    topiramate 25 MG Oral Tab Take 1 tablet (25 mg total) by mouth 2 (two) times daily. 60 tablet 2    bisacodyl 5 MG Oral Tab EC Take 1 tablet (5 mg total) by mouth daily as needed for constipation. 30 tablet 0    senna-docusate 8.6-50 MG Oral Tab Take 1 tablet by mouth daily. 30 tablet 0    Cholecalciferol 50 MCG (2000 UT) Oral Tab Vitamin D3 50 mcg (2,000 unit) tablet, [RxNorm: 533800]      cyclobenzaprine 5 MG Oral Tab TAKE 1-2 TABLETS 2 HOURS BEFORE BEDTIME AS NEEDED (Patient not taking: Reported on 5/28/2025)      spironolactone 100 MG Oral Tab Take 1 tablet (100 mg total) by mouth daily. Take 1 tablet daily for 1 week, then 2 tablets daily for 1 week then 3 tablets daily. 90 tablet 3    MAGNESIUM CITRATE OR Take by mouth at bedtime.

## 2025-07-01 ENCOUNTER — TELEPHONE (OUTPATIENT)
Dept: OBGYN CLINIC | Facility: CLINIC | Age: 30
End: 2025-07-01

## 2025-07-01 NOTE — TELEPHONE ENCOUNTER
TIMOTHY. Pt scheduled for her colposcopy on 07/23/25, but, appointment slot is 15 minutes only.

## 2025-07-02 NOTE — TELEPHONE ENCOUNTER
Name and  verified. Patient scheduled herself for a 15 min window when the appointment needed 30 minutes. Appointment was changed to reflect 30 minutes. Patient may call to reschedule appointment. All questions answered.    Moi

## 2025-07-16 ENCOUNTER — TELEMEDICINE (OUTPATIENT)
Dept: SURGERY | Facility: CLINIC | Age: 30
End: 2025-07-16
Payer: COMMERCIAL

## 2025-07-16 DIAGNOSIS — E78.2 MIXED HYPERLIPIDEMIA: Primary | ICD-10-CM

## 2025-07-16 DIAGNOSIS — G43.819 OTHER MIGRAINE WITHOUT STATUS MIGRAINOSUS, INTRACTABLE: ICD-10-CM

## 2025-07-16 DIAGNOSIS — E55.9 VITAMIN D DEFICIENCY: ICD-10-CM

## 2025-07-16 DIAGNOSIS — E66.3 OVERWEIGHT WITH BODY MASS INDEX (BMI) OF 27 TO 27.9 IN ADULT: ICD-10-CM

## 2025-07-16 DIAGNOSIS — Z51.81 THERAPEUTIC DRUG MONITORING: ICD-10-CM

## 2025-07-16 PROCEDURE — 98006 SYNCH AUDIO-VIDEO EST MOD 30: CPT | Performed by: INTERNAL MEDICINE

## 2025-07-16 RX ORDER — DIETHYLPROPION HYDROCHLORIDE 75 MG/1
1 TABLET, EXTENDED RELEASE ORAL DAILY
Qty: 30 TABLET | Refills: 2 | Status: SHIPPED | OUTPATIENT
Start: 2025-07-16 | End: 2025-10-14

## 2025-07-16 RX ORDER — TOPIRAMATE 25 MG/1
25 TABLET, FILM COATED ORAL 2 TIMES DAILY
Qty: 60 TABLET | Refills: 2 | Status: SHIPPED | OUTPATIENT
Start: 2025-07-16 | End: 2025-10-14

## 2025-07-16 NOTE — PROGRESS NOTES
Patient ID: Paz Paz is a 30 year old female.  No chief complaint on file.         HISTORY OF PRESENT ILLNESS:   Patient presents for above.  This visit is conducted using Telemedicine with live, interactive video and audio.    Initial weight: 173 lbs 2/2025  Current weight: 155 lbs  Interval weight loss: 10 lbs  Total weight loss: 18 lbs    Review of Systems   All other systems reviewed and are negative.     MEDICAL HISTORY:   Past Medical History[1]    Past Surgical History[2]    Medications - Current[3]    Allergies:Allergies[4]    Social History     Socioeconomic History    Marital status: Single     Spouse name: Not on file    Number of children: Not on file    Years of education: Not on file    Highest education level: Not on file   Occupational History    Not on file   Tobacco Use    Smoking status: Never    Smokeless tobacco: Never   Vaping Use    Vaping status: Never Used   Substance and Sexual Activity    Alcohol use: Yes     Comment: Socially    Drug use: Never    Sexual activity: Yes     Partners: Male   Other Topics Concern    Grew up on a farm No    History of tanning Yes    Outdoor occupation No    Breast feeding No    Reaction to local anesthetic No    Pt has a pacemaker No    Pt has a defibrillator No   Social History Narrative    Not on file     Social Drivers of Health     Food Insecurity: No Food Insecurity (5/28/2025)    NCSS - Food Insecurity     Worried About Running Out of Food in the Last Year: No     Ran Out of Food in the Last Year: No   Transportation Needs: No Transportation Needs (5/28/2025)    NCSS - Transportation     Lack of Transportation: No   Stress: Not on File (10/6/2022)    Received from [facility]    Stress     Stress: 0   Housing Stability: Not At Risk (5/28/2025)    NCSS - Housing/Utilities     Has Housing: Yes     Worried About Losing Housing: No     Unable to Get Utilities: No       PHYSICAL EXAM:   Unable to perform vitals or do physical exam as this is a virtual  video visit.  Patient appears alert.  No conversational dyspnea or distress.    ASSESSMENT/PLAN:     1. Mixed hyperlipidemia  2. Overweight with body mass index (BMI) of 27 to 27.9 in adult  3. Vitamin D deficiency  4. Therapeutic drug monitoring  5. Other migraine without status migrainosus, intractable    - Initial weight 173 lb (78.5 kg), Initial Body mass index is 27.5 kg/m².  - Tried phentermine 2023 x 5 months, got HTN and blurry vision  - Hx SVT 11/2024, seen an EP was cleared with extensive workup was on Cardizem   - Echo 12/2024; The left ventricle is normal in size, wall thickness and wall motion; there are no regional wall motion abnormalities. Global left ventricular systolic function is normal. The left ventricular ejection fraction is 62%. Left ventricular diastolic function is normal. 2.The right ventricle is normal in size. Right ventricular systolic function is normal.3.The anterior mitral leaflet is mildly thickened. The posterior mitral leaflet is mildly thickened Mild (1+) mitral regurgitation. Mild mitral valve prolapse is noted. 4.Mild (1+) pulmonic regurgitation.   -Diethylpropion : Since the patient would like to try Diethylpropion, and is aware of the potential side effects (hypertension, palpitations, tachycardia, and anxiety), I will give Paz Paz a prescription today to be used in conjunction with the above diet and exercise program. The patient will check heart rate and blood pressure on a regular basis. Patient will call me if the BP goes over 140/90 or has palpitations or racing heart rate. They understands that I will not call in the prescription and an appointment is needed to have the medication refilled.   -Topiramate; patient denies any personal history of Suicide and Kidney stones, glaucoma, pregnancy. Discussed getting annual eye exams to rule out glaucoma.   Plan:  - Diethylpropion switch to 75 mg daily  - topiramate 25 mg bid     No follow-ups on file.    Time  spent on encounter  30 minutes   Video time 15 minutes   Documentation time 15 minutes     Paz Paz understands video evaluation is not a substitute for face-to-face examination or emergency care. Patient advised to go to ER or call 911 for worsening symptoms or acute distress.     Telehealth outside of [facility]  Telehealth Verbal Consent   I conducted a telehealth visit with Paz Paz today, 04/24/25, which was completed using two-way, real-time interactive audio and video communication. This has been done in good robyn to provide continuity of care in the best interest of the provider-patient relationship, due to the COVID -19 public health crisis/national emergency where restrictions of face-to-face office visits are ongoing. Every conscious effort was taken to allow for sufficient and adequate time to complete the visit.  The patient was made aware of the limitations of the telehealth visit, including treatment limitations as no physical exam could be performed.  The patient was advised to call 911 or to go to the ER in case there was an emergency.  The patient was also advised of the potential privacy & security concerns related to the telehealth platform.   The patient was made aware of where to find [facility]'s notice of privacy practices, telehealth consent form and other related consent forms and documents.  which are located on the [facility] website. The patient verbally agreed to telehealth consent form, related consents and the risks discussed.    Lastly, the patient confirmed that they were in [place].   Included in this visit, time may have been spent reviewing labs, medications, radiology tests and decision making. Appropriate medical decision-making and tests are ordered as detailed in the plan of care above.  Coding/billing information is submitted for this visit based on complexity of care and/or time spent for the visit.    This note was prepared using Dragon Medical voice recognition  dictation software. As a result errors may occur. When identified these errors have been corrected. While every attempt is made to correct errors during dictation discrepancies may still exist.    Yovana Vargas MD           [1]   Past Medical History:   Constipation    Migraine    Rosacea    Scoliosis   [2]   Past Surgical History:  Procedure Laterality Date    Cosmetic surgery      liposunction x 3 times    Other surgical history  2020    Liposuction 360, wisdom teeth    Radiant teeth removed     [3]   Current Outpatient Medications:     Antiseborrheic Products, Misc. (DERMAZINC SOAP) External Bar, WASH FACE EVERY NIGHT, Disp: , Rfl:     Doxycycline Monohydrate 50 MG Oral Tab, Take 1 tablet (50 mg total) by mouth daily. (Patient not taking: Reported on 6/11/2025), Disp: , Rfl:     Emollient (VANICREAM) External Cream, 1 tablet as needed., Disp: , Rfl:     minoxidil 2.5 MG Oral Tab, Take 1.5 mg by mouth in the morning., Disp: , Rfl:     Doxycycline Hyclate 50 MG Oral Tab, Take 1 tablet by mouth 2 (two) times daily. (Patient not taking: Reported on 6/11/2025), Disp: , Rfl:     Diethylpropion HCl ER 75 MG Oral Tablet 24 Hr, Take 1 tablet (75 mg total) by mouth daily., Disp: 30 tablet, Rfl: 2    topiramate 25 MG Oral Tab, Take 1 tablet (25 mg total) by mouth 2 (two) times daily., Disp: 60 tablet, Rfl: 2    bisacodyl 5 MG Oral Tab EC, Take 1 tablet (5 mg total) by mouth daily as needed for constipation., Disp: 30 tablet, Rfl: 0    senna-docusate 8.6-50 MG Oral Tab, Take 1 tablet by mouth daily., Disp: 30 tablet, Rfl: 0    Cholecalciferol 50 MCG (2000 UT) Oral Tab, Vitamin D3 50 mcg (2,000 unit) tablet, [RxNorm: 005227], Disp: , Rfl:     cyclobenzaprine 5 MG Oral Tab, TAKE 1-2 TABLETS 2 HOURS BEFORE BEDTIME AS NEEDED (Patient not taking: Reported on 5/28/2025), Disp: , Rfl:     spironolactone 100 MG Oral Tab, Take 1 tablet (100 mg total) by mouth daily. Take 1 tablet daily for 1 week, then 2 tablets daily for 1 week  then 3 tablets daily., Disp: 90 tablet, Rfl: 3    MAGNESIUM CITRATE OR, Take by mouth at bedtime., Disp: , Rfl:   [4]   Allergies  Allergen Reactions    Latex HIVES, ITCHING, RASH and SWELLING

## 2025-07-23 ENCOUNTER — OFFICE VISIT (OUTPATIENT)
Dept: OBGYN CLINIC | Facility: CLINIC | Age: 30
End: 2025-07-23
Payer: COMMERCIAL

## 2025-07-23 VITALS
DIASTOLIC BLOOD PRESSURE: 77 MMHG | BODY MASS INDEX: 24.81 KG/M2 | WEIGHT: 156.19 LBS | HEART RATE: 89 BPM | SYSTOLIC BLOOD PRESSURE: 114 MMHG | HEIGHT: 66.5 IN

## 2025-07-23 DIAGNOSIS — R87.610 PAPANICOLAOU SMEAR OF CERVIX WITH ATYPICAL SQUAMOUS CELLS OF UNDETERMINED SIGNIFICANCE (ASC-US): ICD-10-CM

## 2025-07-23 DIAGNOSIS — N87.0 MILD DYSPLASIA OF CERVIX: ICD-10-CM

## 2025-07-23 DIAGNOSIS — Z23 NEED FOR HPV VACCINATION: ICD-10-CM

## 2025-07-23 DIAGNOSIS — Z01.812 PRE-PROCEDURAL LABORATORY EXAMINATION: ICD-10-CM

## 2025-07-23 DIAGNOSIS — R87.810 CERVICAL HIGH RISK HUMAN PAPILLOMAVIRUS (HPV) DNA TEST POSITIVE: Primary | ICD-10-CM

## 2025-07-23 PROCEDURE — 90651 9VHPV VACCINE 2/3 DOSE IM: CPT | Performed by: ADVANCED PRACTICE MIDWIFE

## 2025-07-23 PROCEDURE — 57454 BX/CURETT OF CERVIX W/SCOPE: CPT | Performed by: ADVANCED PRACTICE MIDWIFE

## 2025-07-23 PROCEDURE — 81025 URINE PREGNANCY TEST: CPT | Performed by: ADVANCED PRACTICE MIDWIFE

## 2025-07-23 PROCEDURE — 90471 IMMUNIZATION ADMIN: CPT | Performed by: ADVANCED PRACTICE MIDWIFE

## 2025-07-23 NOTE — PROCEDURES
COLPOSCOPY PROCEDURE NOTE    Paz Paz is a 30 year old female  who is  referred for colposcopy by [facility].    HISTORY  Age: 30 year old Patient's last menstrual period was 2025 (exact date).  Indication: ASCUS and +hrHPV  Previous abnormal screening results : reports previous colpo  Previous abnormal colpo: no  Previous treatment: observation  Received HPV vaccine?: getting today  Current contraception: condoms  History of other GYN infections: chlamydia: , HSV: , and yeast  Tobacco use: No  Allergic to latex: No  Allergic to Iodine or Betadine: No    LAB  A pregnancy test was completed and results are negative  PRE-PROCEDURE  The nature and meaning of PAP smears discussed: Yes  HPV infection in relation to PAP smear changes discussed: Yes  Cervical dysplasia and its significance and natural history discussed: Yes  Colposcopy procedure explained: Yes  Side effects, risks and complications discussed (including risk of bleeding,  infection, non-diagnostic colposcopy result): Yes    PROCEDURE NOTE    [unfilled]  Vitals:    25 1024   BP: 114/77   BP Location: Right arm   Patient Position: Sitting   Cuff Size: adult   Pulse: 89   Weight: 156 lb 3.2 oz (70.9 kg)   Height: 5' 6.5\" (1.689 m)     The vulva, vagina, and perianal area were examined with findings of no gross abnormalities  A speculum was placed and the cervix was painted with acetic acid. The  following findings were noted:  Cervix: fully visualized  Squamocolumnar junction: fully visualized  Acetowhite changes: yes  Lesion(s) present (acetowhite or other): no  Lesion description  Encircling os from 2-9 white  Features:Low grade features:thin/translucent, rapidly fading acetowhite, and flat  Impression: Low Grade  Anesthesia: none  Endocervical curettage performed: Yes  Cervical biopsies obtained at 2,7,9 o'clock.  Random biopsies done: Yes_Number of biopsies performed 1  Other biopsies: GYN NONE: None  Hemostasis:  pressure and silver nitrate  Physical Exam  Genitourinary:     General: Normal vulva.      Vagina: No vaginal discharge.                PLAN:    Results will be communicated with the patient via MyChart  Discussed the importance of appropriate follow-up: YES  Diagnosis added to problem list, medical history, surgical history as indicated:  Yes  Educated the patient on the risks related to HPV infection and potential for the virus to cause cervical cancer, as well as some head, neck & anal cancers. I stressed the importance of compliance with routine pelvic exams and pap smears

## 2025-07-24 ENCOUNTER — MOBILE ENCOUNTER (OUTPATIENT)
Dept: OBGYN CLINIC | Facility: CLINIC | Age: 30
End: 2025-07-24

## 2025-07-24 LAB
BV BACTERIA DNA VAG QL NAA+PROBE: NEGATIVE
C GLABRATA DNA VAG QL NAA+PROBE: NEGATIVE
C KRUSEI DNA VAG QL NAA+PROBE: NEGATIVE
CANDIDA DNA VAG QL NAA+PROBE: NEGATIVE
T VAGINALIS DNA VAG QL NAA+PROBE: NEGATIVE

## 2025-07-24 RX ORDER — DOXYCYCLINE HYCLATE 100 MG
100 TABLET ORAL 2 TIMES DAILY
Qty: 14 TABLET | Refills: 0 | Status: SHIPPED | OUTPATIENT
Start: 2025-07-24 | End: 2025-07-31

## 2025-07-25 ENCOUNTER — TELEPHONE (OUTPATIENT)
Dept: OBGYN CLINIC | Facility: CLINIC | Age: 30
End: 2025-07-25

## 2025-07-25 LAB
C TRACH DNA SPEC QL NAA+PROBE: POSITIVE
N GONORRHOEA DNA SPEC QL NAA+PROBE: NEGATIVE

## 2025-07-25 NOTE — TELEPHONE ENCOUNTER
Incoming call received from CNM. CNM discussed results with pt and requesting EPT called into pharmacy. Partner information received and EPT called into pharmacy.

## 2025-08-27 ENCOUNTER — ULTRASOUND ENCOUNTER (OUTPATIENT)
Dept: OBGYN CLINIC | Facility: CLINIC | Age: 30
End: 2025-08-27

## 2025-08-27 DIAGNOSIS — N92.6 IRREGULAR MENSES: ICD-10-CM

## 2025-08-27 PROCEDURE — 76830 TRANSVAGINAL US NON-OB: CPT | Performed by: OBSTETRICS & GYNECOLOGY

## 2025-08-27 PROCEDURE — 76856 US EXAM PELVIC COMPLETE: CPT | Performed by: OBSTETRICS & GYNECOLOGY

## (undated) NOTE — LETTER
24      Paz Paz  :  1995      To Whom It May Concern:    This patient was seen in our office on 24.  Work status: Continue modified duty restrictions; may work up to 6 hour shifts. She will follow up with me in 3 weeks.     If this office may be of further assistance, please do not hesitate to contact us.      Sincerely,        Galo Wilks, DO

## (undated) NOTE — LETTER
24    Paz Paz  :  1995    To Whom It May Concern:    This patient was seen in our office on 24.  Work status:  May return to work full-time with restrictions - may work without restrictions, no more than 6 hour shifts. She will follow up with me in 2 weeks if having issues. If there are no issues she is to let me know and I will release her to work full duty without restrictions without having to see me for follow up .    If this office may be of further assistance, please do not hesitate to contact us.      Sincerely,        Galo Wilks, DO

## (undated) NOTE — LETTER
24    Paz Paz  :  1995    To Whom It May Concern:    This patient was seen in our office on 24.  Work status:   Recommend light duty restrictions - no lifting, pushing, pulling greater than 15 pounds. She should complete 4 more sessions of physical therapy and then follow up afterwards.     If this office may be of further assistance, please do not hesitate to contact us.      Sincerely,        Galo Wilks, DO

## (undated) NOTE — LETTER
Date & Time: 5/6/2024, 11:46 AM  Patient: Paz Paz  Encounter Provider(s):    River Rosenthal APRN       To Whom It May Concern:    Paz Paz was seen and treated in our department on 5/6/2024. Please excuse from work today and tomorrow.    If you have any questions or concerns, please do not hesitate to call.        _____________________________  Physician/APC Signature

## (undated) NOTE — LETTER
25    Paz Paz  :  1995    To Whom It May Concern:    This patient was seen in our office on 25.  Work status:  May return to work full-time, no restrictions. She may follow up with me on an as-needed basis.       If this office may be of further assistance, please do not hesitate to contact us.      Sincerely,        Galo Wilks, DO

## (undated) NOTE — LETTER
Date & Time: 4/21/2023, 3:32 PM  Patient: Alexsandra Neff  Encounter Provider(s):    FOREST Conti       To Whom It May Concern:    Alexsandra Neff was seen and treated in our department on 4/20/2023. She should not return to school until 04/21/2023 .     If you have any questions or concerns, please do not hesitate to call.        _____________________________  Physician/APC Signature

## (undated) NOTE — LETTER
WHERE IS YOUR PAIN NOW?  Joy the areas on your body where you feel the described sensations.  Use the appropriate symbol.  Joy the areas of radiation.  Include all affected areas.  Just to complete the picture, please draw in the face.     ACHE:  ^ ^ ^   NUMBNESS:  0000   PINS & NEEDLES:  = = = =                              ^ ^ ^                       0000              = = = =                                    ^ ^ ^                       0000            = = = =      BURNING:  XXXX   STABBING: ////                  XXXX                ////                         XXXX          ////     Please joy the line below indicating your degree of pain right now  with 0 being no pain 10 being the worst pain possible.                                         0             1             2              3             4              5              6              7             8             9             10         Patient Signature:

## (undated) NOTE — LETTER
AUTHORIZATION FOR SURGICAL OPERATION OR OTHER PROCEDURE    1. I hereby authorize Sharyn Gann, and [facility] staff assigned to my case to perform the following operation and/or procedure at the [facility] site:    _______________________________________________________________________________________________      _______________________________________________________________________________________________    2.  My physician has explained the nature and purpose of the operation or other procedure, possible alternative methods of treatment, the risks involved, and the possibility of complication to me.  I acknowledge that no guarantee has been made as to the result that may be obtained.  3.  I recognize that, during the course of this operation, or other procedure, unforseen conditions may necessitate additional or different procedure than those listed above.  I, therefore, further authorize and request that the above named physician, his/her physician assistants or designees perform such procedures as are, in his/her professional opinion, necessary and desirable.  4.  Any tissue or organs removed in the operation or other procedure may be disposed of by and at the discretion of the [facility] and [facility].  5.  I understand that in the event of a medical emergency, I will be transported by local paramedics to [facility] or other hospital emergency department.  6.  I certify that I have read and fully understand the above consent to operation and/or other procedure.    7.  I acknowledge that my physician has explained sedation/analgesia administration to me including the risks and benefits.  I consent to the administration of sedation/analgesia as may be necessary or desirable in the judgement of my physician.    Witness signature: ___________________________________________________ Date:  ______/______/_____                    Time:  ________  A.M.  P.M.       Patient Name:  ______________________________________________________  (please print)      Patient signature:  ___________________________________________________             Relationship to Patient:           []  Parent    Responsible person                          []  Spouse  In case of minor or                    [] Other  _____________   Incompetent name:  __________________________________________________                               (please print)      _____________      Responsible person  In case of minor or  Incompetent signature:  _______________________________________________    Statement of Physician  My signature below affirms that prior to the time of the procedure, I have explained to the patient and/or his/her guardian, the risks and benefits involved in the proposed treatment and any reasonable alternative to the proposed treatment.  I have also explained the risks and benefits involved in the refusal of the proposed treatment and have answered the patient's questions.                        Date:  ______/______/_______  Provider                      Signature:  __________________________________________________________       Time:  ___________ A.M    P.M.

## (undated) NOTE — LETTER
24          Paz Paz  :  1995      To Whom It May Concern:    This patient was seen in our office on 24. Work status: Continue modified duty restrictions; may work up to 6 hour shifts. She will follow up with me in 3 weeks.     If this office may be of further assistance, please do not hesitate to contact us.      Sincerely,        Galo Wilks, DO